# Patient Record
Sex: FEMALE | Race: WHITE | NOT HISPANIC OR LATINO | Employment: UNEMPLOYED | URBAN - METROPOLITAN AREA
[De-identification: names, ages, dates, MRNs, and addresses within clinical notes are randomized per-mention and may not be internally consistent; named-entity substitution may affect disease eponyms.]

---

## 2017-01-01 ENCOUNTER — HOSPITAL ENCOUNTER (INPATIENT)
Facility: HOSPITAL | Age: 47
LOS: 1 days | DRG: 432 | End: 2017-11-15
Attending: EMERGENCY MEDICINE | Admitting: INTERNAL MEDICINE

## 2017-01-01 VITALS
OXYGEN SATURATION: 1 % | TEMPERATURE: 98 F | WEIGHT: 163.81 LBS | HEIGHT: 66 IN | SYSTOLIC BLOOD PRESSURE: 102 MMHG | BODY MASS INDEX: 26.33 KG/M2 | DIASTOLIC BLOOD PRESSURE: 66 MMHG

## 2017-01-01 DIAGNOSIS — J96.01 ACUTE HYPOXEMIC RESPIRATORY FAILURE: ICD-10-CM

## 2017-01-01 DIAGNOSIS — D62 ACUTE BLOOD LOSS ANEMIA: ICD-10-CM

## 2017-01-01 DIAGNOSIS — F10.939 ALCOHOL WITHDRAWAL SYNDROME WITH COMPLICATION: ICD-10-CM

## 2017-01-01 DIAGNOSIS — R57.9 SHOCK: ICD-10-CM

## 2017-01-01 DIAGNOSIS — K92.2 GASTROINTESTINAL HEMORRHAGE, UNSPECIFIED GASTROINTESTINAL HEMORRHAGE TYPE: Primary | ICD-10-CM

## 2017-01-01 DIAGNOSIS — Z97.8 ENDOTRACHEALLY INTUBATED: ICD-10-CM

## 2017-01-01 DIAGNOSIS — K74.60 CIRRHOSIS OF LIVER WITHOUT ASCITES, UNSPECIFIED HEPATIC CIRRHOSIS TYPE: ICD-10-CM

## 2017-01-01 LAB
ABO + RH BLD: NORMAL
ALBUMIN SERPL BCP-MCNC: 1.6 G/DL
ALBUMIN SERPL BCP-MCNC: 1.7 G/DL
ALBUMIN SERPL BCP-MCNC: 2.4 G/DL
ALBUMIN SERPL BCP-MCNC: 2.4 G/DL
ALBUMIN SERPL BCP-MCNC: 2.5 G/DL
ALBUMIN SERPL BCP-MCNC: 2.6 G/DL
ALBUMIN SERPL BCP-MCNC: 2.9 G/DL
ALLENS TEST: ABNORMAL
ALP SERPL-CCNC: 118 U/L
ALP SERPL-CCNC: 160 U/L
ALP SERPL-CCNC: 44 U/L
ALP SERPL-CCNC: 56 U/L
ALP SERPL-CCNC: 61 U/L
ALP SERPL-CCNC: 75 U/L
ALP SERPL-CCNC: 79 U/L
ALT SERPL W/O P-5'-P-CCNC: 114 U/L
ALT SERPL W/O P-5'-P-CCNC: 114 U/L
ALT SERPL W/O P-5'-P-CCNC: 289 U/L
ALT SERPL W/O P-5'-P-CCNC: 31 U/L
ALT SERPL W/O P-5'-P-CCNC: 555 U/L
ALT SERPL W/O P-5'-P-CCNC: 69 U/L
ALT SERPL W/O P-5'-P-CCNC: 825 U/L
AMPHET+METHAMPHET UR QL: NEGATIVE
ANION GAP SERPL CALC-SCNC: 25 MMOL/L
ANION GAP SERPL CALC-SCNC: 31 MMOL/L
ANION GAP SERPL CALC-SCNC: 32 MMOL/L
ANION GAP SERPL CALC-SCNC: 32 MMOL/L
ANION GAP SERPL CALC-SCNC: 35 MMOL/L
ANION GAP SERPL CALC-SCNC: 35 MMOL/L
ANION GAP SERPL CALC-SCNC: 36 MMOL/L
ANISOCYTOSIS BLD QL SMEAR: SLIGHT
APTT BLDCRRT: 104.6 SEC
AST SERPL-CCNC: 138 U/L
AST SERPL-CCNC: 1754 U/L
AST SERPL-CCNC: 374 U/L
AST SERPL-CCNC: 4104 U/L
AST SERPL-CCNC: 636 U/L
AST SERPL-CCNC: 671 U/L
AST SERPL-CCNC: 7915 U/L
BARBITURATES UR QL SCN>200 NG/ML: NEGATIVE
BASOPHILS # BLD AUTO: 0 K/UL
BASOPHILS # BLD AUTO: ABNORMAL K/UL
BASOPHILS NFR BLD: 0 %
BASOPHILS NFR BLD: 0.1 %
BASOPHILS NFR BLD: 0.1 %
BASOPHILS NFR BLD: 0.2 %
BENZODIAZ UR QL SCN>200 NG/ML: NEGATIVE
BILIRUB SERPL-MCNC: 3.5 MG/DL
BILIRUB SERPL-MCNC: 3.7 MG/DL
BILIRUB SERPL-MCNC: 4.3 MG/DL
BILIRUB SERPL-MCNC: 4.8 MG/DL
BILIRUB SERPL-MCNC: 6.4 MG/DL
BILIRUB SERPL-MCNC: 7.4 MG/DL
BILIRUB SERPL-MCNC: 8.2 MG/DL
BLD GP AB SCN CELLS X3 SERPL QL: NORMAL
BLD PROD TYP BPU: NORMAL
BLOOD UNIT EXPIRATION DATE: NORMAL
BLOOD UNIT TYPE CODE: 600
BLOOD UNIT TYPE CODE: 600
BLOOD UNIT TYPE CODE: 6200
BLOOD UNIT TYPE CODE: 9500
BLOOD UNIT TYPE CODE: 9500
BLOOD UNIT TYPE: NORMAL
BUN SERPL-MCNC: 10 MG/DL
BUN SERPL-MCNC: 11 MG/DL
BUN SERPL-MCNC: 11 MG/DL
BUN SERPL-MCNC: 9 MG/DL
BUN SERPL-MCNC: 9 MG/DL
BURR CELLS BLD QL SMEAR: ABNORMAL
BZE UR QL SCN: NEGATIVE
CA-I BLDV-SCNC: 0.65 MMOL/L
CALCIUM SERPL-MCNC: 5.9 MG/DL
CALCIUM SERPL-MCNC: 6.1 MG/DL
CALCIUM SERPL-MCNC: 6.3 MG/DL
CALCIUM SERPL-MCNC: 6.3 MG/DL
CALCIUM SERPL-MCNC: 6.6 MG/DL
CALCIUM SERPL-MCNC: 7.1 MG/DL
CALCIUM SERPL-MCNC: 8.7 MG/DL
CANNABINOIDS UR QL SCN: NEGATIVE
CHLORIDE SERPL-SCNC: 102 MMOL/L
CHLORIDE SERPL-SCNC: 104 MMOL/L
CHLORIDE SERPL-SCNC: 105 MMOL/L
CHLORIDE SERPL-SCNC: 107 MMOL/L
CHLORIDE SERPL-SCNC: 97 MMOL/L
CHLORIDE SERPL-SCNC: 99 MMOL/L
CHLORIDE SERPL-SCNC: 99 MMOL/L
CHOLEST SERPL-MCNC: 57 MG/DL
CHOLEST/HDLC SERPL: 3.4 {RATIO}
CK MB SERPL-MCNC: 162.4 NG/ML
CK MB SERPL-MCNC: 162.4 NG/ML
CK MB SERPL-MCNC: 255.9 NG/ML
CK MB SERPL-MCNC: 75.6 NG/ML
CK MB SERPL-MCNC: 89.5 NG/ML
CK MB SERPL-MCNC: 89.5 NG/ML
CK MB SERPL-RTO: 4.6 %
CK MB SERPL-RTO: 4.8 %
CK MB SERPL-RTO: 5.5 %
CK SERPL-CCNC: 1368 U/L
CK SERPL-CCNC: 1860 U/L
CK SERPL-CCNC: 1860 U/L
CK SERPL-CCNC: 3380 U/L
CK SERPL-CCNC: 3380 U/L
CK SERPL-CCNC: 5569 U/L
CO2 SERPL-SCNC: 10 MMOL/L
CO2 SERPL-SCNC: 11 MMOL/L
CO2 SERPL-SCNC: 6 MMOL/L
CO2 SERPL-SCNC: 7 MMOL/L
CO2 SERPL-SCNC: 9 MMOL/L
CODING SYSTEM: NORMAL
CREAT SERPL-MCNC: 0.7 MG/DL
CREAT SERPL-MCNC: 0.9 MG/DL
CREAT SERPL-MCNC: 1.1 MG/DL
CREAT SERPL-MCNC: 1.2 MG/DL
CREAT SERPL-MCNC: 1.4 MG/DL
CREAT SERPL-MCNC: 1.5 MG/DL
CREAT SERPL-MCNC: 1.6 MG/DL
CREAT UR-MCNC: 49.5 MG/DL
DELSYS: ABNORMAL
DIFFERENTIAL METHOD: ABNORMAL
DISPENSE STATUS: NORMAL
EOSINOPHIL # BLD AUTO: 0 K/UL
EOSINOPHIL # BLD AUTO: ABNORMAL K/UL
EOSINOPHIL NFR BLD: 0 %
EOSINOPHIL NFR BLD: 0.1 %
EOSINOPHIL NFR BLD: 1 %
EOSINOPHIL NFR BLD: 1 %
EOSINOPHIL NFR BLD: 3 %
ERYTHROCYTE [DISTWIDTH] IN BLOOD BY AUTOMATED COUNT: 15.1 %
ERYTHROCYTE [DISTWIDTH] IN BLOOD BY AUTOMATED COUNT: 15.2 %
ERYTHROCYTE [DISTWIDTH] IN BLOOD BY AUTOMATED COUNT: 15.3 %
ERYTHROCYTE [DISTWIDTH] IN BLOOD BY AUTOMATED COUNT: 15.4 %
ERYTHROCYTE [DISTWIDTH] IN BLOOD BY AUTOMATED COUNT: 15.6 %
ERYTHROCYTE [DISTWIDTH] IN BLOOD BY AUTOMATED COUNT: 15.7 %
ERYTHROCYTE [DISTWIDTH] IN BLOOD BY AUTOMATED COUNT: 15.9 %
ERYTHROCYTE [DISTWIDTH] IN BLOOD BY AUTOMATED COUNT: 16.9 %
ERYTHROCYTE [DISTWIDTH] IN BLOOD BY AUTOMATED COUNT: 18.1 %
ERYTHROCYTE [SEDIMENTATION RATE] IN BLOOD BY WESTERGREN METHOD: 16 MM/H
ERYTHROCYTE [SEDIMENTATION RATE] IN BLOOD BY WESTERGREN METHOD: 26 MM/H
EST. GFR  (AFRICAN AMERICAN): 43 ML/MIN/1.73 M^2
EST. GFR  (AFRICAN AMERICAN): 47 ML/MIN/1.73 M^2
EST. GFR  (AFRICAN AMERICAN): 51 ML/MIN/1.73 M^2
EST. GFR  (AFRICAN AMERICAN): >60 ML/MIN/1.73 M^2
EST. GFR  (NON AFRICAN AMERICAN): 38 ML/MIN/1.73 M^2
EST. GFR  (NON AFRICAN AMERICAN): 41 ML/MIN/1.73 M^2
EST. GFR  (NON AFRICAN AMERICAN): 44 ML/MIN/1.73 M^2
EST. GFR  (NON AFRICAN AMERICAN): 53 ML/MIN/1.73 M^2
EST. GFR  (NON AFRICAN AMERICAN): 59 ML/MIN/1.73 M^2
EST. GFR  (NON AFRICAN AMERICAN): >60 ML/MIN/1.73 M^2
EST. GFR  (NON AFRICAN AMERICAN): >60 ML/MIN/1.73 M^2
ETCO2: 15
ETCO2: 16
ETCO2: 19
ETCO2: 19
FIO2: 100
FIO2: 50
FIO2: 70
GIANT PLATELETS BLD QL SMEAR: PRESENT
GLUCOSE SERPL-MCNC: 116 MG/DL
GLUCOSE SERPL-MCNC: 141 MG/DL
GLUCOSE SERPL-MCNC: 163 MG/DL
GLUCOSE SERPL-MCNC: 178 MG/DL
GLUCOSE SERPL-MCNC: 197 MG/DL
GLUCOSE SERPL-MCNC: 82 MG/DL
GLUCOSE SERPL-MCNC: 84 MG/DL
HCO3 UR-SCNC: 10.3 MMOL/L (ref 24–28)
HCO3 UR-SCNC: 11.2 MMOL/L (ref 24–28)
HCO3 UR-SCNC: 12.1 MMOL/L (ref 24–28)
HCO3 UR-SCNC: 12.2 MMOL/L (ref 24–28)
HCO3 UR-SCNC: 19.8 MMOL/L (ref 24–28)
HCO3 UR-SCNC: 8.3 MMOL/L (ref 24–28)
HCO3 UR-SCNC: 9.3 MMOL/L (ref 24–28)
HCO3 UR-SCNC: 9.6 MMOL/L (ref 24–28)
HCT VFR BLD AUTO: 14.7 %
HCT VFR BLD AUTO: 19.9 %
HCT VFR BLD AUTO: 21.2 %
HCT VFR BLD AUTO: 23 %
HCT VFR BLD AUTO: 23.5 %
HCT VFR BLD AUTO: 23.6 %
HCT VFR BLD AUTO: 25.1 %
HCT VFR BLD AUTO: 27.2 %
HCT VFR BLD AUTO: 32 %
HDLC SERPL-MCNC: 17 MG/DL
HDLC SERPL: 29.8 %
HGB BLD-MCNC: 10.5 G/DL
HGB BLD-MCNC: 5.1 G/DL
HGB BLD-MCNC: 5.1 G/DL
HGB BLD-MCNC: 6.7 G/DL
HGB BLD-MCNC: 7.2 G/DL
HGB BLD-MCNC: 7.9 G/DL
HGB BLD-MCNC: 8.1 G/DL
HGB BLD-MCNC: 8.1 G/DL
HGB BLD-MCNC: 8.2 G/DL
HGB BLD-MCNC: 8.4 G/DL
HGB BLD-MCNC: 8.4 G/DL
HGB BLD-MCNC: 9.1 G/DL
HGB BLD-MCNC: 9.1 G/DL
INR PPP: 2.8
LACTATE SERPL-SCNC: >12 MMOL/L
LDLC SERPL CALC-MCNC: ABNORMAL MG/DL
LYMPHOCYTES # BLD AUTO: 1.1 K/UL
LYMPHOCYTES # BLD AUTO: 1.5 K/UL
LYMPHOCYTES # BLD AUTO: 1.7 K/UL
LYMPHOCYTES # BLD AUTO: ABNORMAL K/UL
LYMPHOCYTES NFR BLD: 12.5 %
LYMPHOCYTES NFR BLD: 12.7 %
LYMPHOCYTES NFR BLD: 16.9 %
LYMPHOCYTES NFR BLD: 19 %
LYMPHOCYTES NFR BLD: 20 %
LYMPHOCYTES NFR BLD: 22 %
LYMPHOCYTES NFR BLD: 23 %
LYMPHOCYTES NFR BLD: 24 %
LYMPHOCYTES NFR BLD: 35 %
MAGNESIUM SERPL-MCNC: 1.4 MG/DL
MCH RBC QN AUTO: 29.2 PG
MCH RBC QN AUTO: 29.4 PG
MCH RBC QN AUTO: 29.6 PG
MCH RBC QN AUTO: 29.6 PG
MCH RBC QN AUTO: 30.5 PG
MCH RBC QN AUTO: 30.6 PG
MCH RBC QN AUTO: 33.1 PG
MCH RBC QN AUTO: 33.6 PG
MCH RBC QN AUTO: 35.5 PG
MCHC RBC AUTO-ENTMCNC: 32.9 G/DL
MCHC RBC AUTO-ENTMCNC: 33.2 G/DL
MCHC RBC AUTO-ENTMCNC: 33.4 G/DL
MCHC RBC AUTO-ENTMCNC: 33.5 G/DL
MCHC RBC AUTO-ENTMCNC: 33.6 G/DL
MCHC RBC AUTO-ENTMCNC: 34.1 G/DL
MCHC RBC AUTO-ENTMCNC: 34.8 G/DL
MCHC RBC AUTO-ENTMCNC: 35.1 G/DL
MCHC RBC AUTO-ENTMCNC: 35.1 G/DL
MCV RBC AUTO: 107 FL
MCV RBC AUTO: 87 FL
MCV RBC AUTO: 87 FL
MCV RBC AUTO: 88 FL
MCV RBC AUTO: 88 FL
MCV RBC AUTO: 89 FL
MCV RBC AUTO: 89 FL
MCV RBC AUTO: 95 FL
MCV RBC AUTO: 99 FL
METHADONE UR QL SCN>300 NG/ML: NEGATIVE
MIN VOL: 14.1
MIN VOL: 15.4
MIN VOL: 15.7
MIN VOL: 15.7
MIN VOL: 16
MIN VOL: 16.1
MODE: ABNORMAL
MONOCYTES # BLD AUTO: 0.2 K/UL
MONOCYTES # BLD AUTO: 0.4 K/UL
MONOCYTES # BLD AUTO: 0.6 K/UL
MONOCYTES # BLD AUTO: ABNORMAL K/UL
MONOCYTES NFR BLD: 0 %
MONOCYTES NFR BLD: 2.5 %
MONOCYTES NFR BLD: 3 %
MONOCYTES NFR BLD: 3.3 %
MONOCYTES NFR BLD: 4 %
MONOCYTES NFR BLD: 4 %
MONOCYTES NFR BLD: 5 %
MONOCYTES NFR BLD: 5 %
MONOCYTES NFR BLD: 6.3 %
NEUTROPHILS # BLD AUTO: 11.4 K/UL
NEUTROPHILS # BLD AUTO: 7 K/UL
NEUTROPHILS # BLD AUTO: 7.5 K/UL
NEUTROPHILS NFR BLD: 47 %
NEUTROPHILS NFR BLD: 68 %
NEUTROPHILS NFR BLD: 69 %
NEUTROPHILS NFR BLD: 71 %
NEUTROPHILS NFR BLD: 72 %
NEUTROPHILS NFR BLD: 73 %
NEUTROPHILS NFR BLD: 76.7 %
NEUTROPHILS NFR BLD: 83.7 %
NEUTROPHILS NFR BLD: 84.9 %
NEUTS BAND NFR BLD MANUAL: 11 %
NEUTS BAND NFR BLD MANUAL: 2 %
NEUTS BAND NFR BLD MANUAL: 2 %
NEUTS BAND NFR BLD MANUAL: 3 %
NEUTS BAND NFR BLD MANUAL: 6 %
NEUTS BAND NFR BLD MANUAL: 7 %
NONHDLC SERPL-MCNC: 40 MG/DL
NRBC BLD-RTO: 2 /100 WBC
NRBC BLD-RTO: 2 /100 WBC
NRBC BLD-RTO: 3 /100 WBC
NRBC BLD-RTO: 4 /100 WBC
NRBC BLD-RTO: 5 /100 WBC
NUM UNITS TRANS FFP: NORMAL
NUM UNITS TRANS PACKED RBC: NORMAL
OPIATES UR QL SCN: NEGATIVE
OVALOCYTES BLD QL SMEAR: ABNORMAL
OVALOCYTES BLD QL SMEAR: ABNORMAL
PCO2 BLDA: 26 MMHG (ref 35–45)
PCO2 BLDA: 29.1 MMHG (ref 35–45)
PCO2 BLDA: 29.1 MMHG (ref 35–45)
PCO2 BLDA: 33.4 MMHG (ref 35–45)
PCO2 BLDA: 33.4 MMHG (ref 35–45)
PCO2 BLDA: 34.8 MMHG (ref 35–45)
PCO2 BLDA: 36.6 MMHG (ref 35–45)
PCO2 BLDA: 40.7 MMHG (ref 35–45)
PCP UR QL SCN>25 NG/ML: NEGATIVE
PEEP: 5
PEEP: 8
PH SMN: 6.97 [PH] (ref 7.35–7.45)
PH SMN: 7 [PH] (ref 7.35–7.45)
PH SMN: 7.05 [PH] (ref 7.35–7.45)
PH SMN: 7.17 [PH] (ref 7.35–7.45)
PH SMN: 7.19 [PH] (ref 7.35–7.45)
PH SMN: 7.21 [PH] (ref 7.35–7.45)
PH SMN: 7.23 [PH] (ref 7.35–7.45)
PH SMN: 7.34 [PH] (ref 7.35–7.45)
PIP: 19
PIP: 19
PIP: 21
PIP: 28
PIP: 30
PIP: 37
PLATELET # BLD AUTO: 100 K/UL
PLATELET # BLD AUTO: 109 K/UL
PLATELET # BLD AUTO: 16 K/UL
PLATELET # BLD AUTO: 43 K/UL
PLATELET # BLD AUTO: 55 K/UL
PLATELET # BLD AUTO: 56 K/UL
PLATELET # BLD AUTO: 60 K/UL
PLATELET # BLD AUTO: 67 K/UL
PLATELET # BLD AUTO: 91 K/UL
PLATELET BLD QL SMEAR: ABNORMAL
PMV BLD AUTO: 10.2 FL
PMV BLD AUTO: 7.7 FL
PMV BLD AUTO: 7.7 FL
PMV BLD AUTO: 7.9 FL
PMV BLD AUTO: 9.1 FL
PMV BLD AUTO: 9.2 FL
PMV BLD AUTO: 9.3 FL
PMV BLD AUTO: 9.4 FL
PMV BLD AUTO: 9.9 FL
PO2 BLDA: 217 MMHG (ref 80–100)
PO2 BLDA: 275 MMHG (ref 80–100)
PO2 BLDA: 334 MMHG (ref 80–100)
PO2 BLDA: 376 MMHG (ref 80–100)
PO2 BLDA: 58 MMHG (ref 80–100)
PO2 BLDA: 587 MMHG (ref 80–100)
PO2 BLDA: 64 MMHG (ref 80–100)
PO2 BLDA: 73 MMHG (ref 80–100)
POC BE: -15 MMOL/L
POC BE: -16 MMOL/L
POC BE: -17 MMOL/L
POC BE: -18 MMOL/L
POC BE: -21 MMOL/L
POC BE: -23 MMOL/L
POC BE: -23 MMOL/L
POC BE: -6 MMOL/L
POC SATURATED O2: 100 % (ref 95–100)
POC SATURATED O2: 84 % (ref 95–100)
POC SATURATED O2: 88 % (ref 95–100)
POC SATURATED O2: 90 % (ref 95–100)
POC TCO2: 10 MMOL/L (ref 23–27)
POC TCO2: 11 MMOL/L (ref 23–27)
POC TCO2: 11 MMOL/L (ref 23–27)
POC TCO2: 12 MMOL/L (ref 23–27)
POC TCO2: 13 MMOL/L (ref 23–27)
POC TCO2: 13 MMOL/L (ref 23–27)
POC TCO2: 21 MMOL/L (ref 23–27)
POC TCO2: 9 MMOL/L (ref 23–27)
POIKILOCYTOSIS BLD QL SMEAR: SLIGHT
POLYCHROMASIA BLD QL SMEAR: ABNORMAL
POTASSIUM SERPL-SCNC: 3.2 MMOL/L
POTASSIUM SERPL-SCNC: 3.3 MMOL/L
POTASSIUM SERPL-SCNC: 3.4 MMOL/L
POTASSIUM SERPL-SCNC: 3.5 MMOL/L
POTASSIUM SERPL-SCNC: 3.6 MMOL/L
POTASSIUM SERPL-SCNC: 4.3 MMOL/L
POTASSIUM SERPL-SCNC: 4.8 MMOL/L
PROT SERPL-MCNC: 2.6 G/DL
PROT SERPL-MCNC: 2.9 G/DL
PROT SERPL-MCNC: 3.7 G/DL
PROT SERPL-MCNC: 3.7 G/DL
PROT SERPL-MCNC: 3.9 G/DL
PROT SERPL-MCNC: 4 G/DL
PROT SERPL-MCNC: 4.2 G/DL
PROTHROMBIN TIME: 28.2 SEC
RBC # BLD AUTO: 1.55 M/UL
RBC # BLD AUTO: 2.15 M/UL
RBC # BLD AUTO: 2.22 M/UL
RBC # BLD AUTO: 2.28 M/UL
RBC # BLD AUTO: 2.64 M/UL
RBC # BLD AUTO: 2.7 M/UL
RBC # BLD AUTO: 2.83 M/UL
RBC # BLD AUTO: 3.08 M/UL
RBC # BLD AUTO: 3.61 M/UL
SAMPLE: ABNORMAL
SITE: ABNORMAL
SODIUM SERPL-SCNC: 140 MMOL/L
SODIUM SERPL-SCNC: 141 MMOL/L
SODIUM SERPL-SCNC: 141 MMOL/L
SODIUM SERPL-SCNC: 143 MMOL/L
SODIUM SERPL-SCNC: 144 MMOL/L
SODIUM SERPL-SCNC: 146 MMOL/L
SODIUM SERPL-SCNC: 149 MMOL/L
SP02: 100
TOXICOLOGY INFORMATION: NORMAL
TRANS PLATPHERESIS VOL PATIENT: NORMAL ML
TRIGL SERPL-MCNC: 429 MG/DL
TROPONIN I SERPL DL<=0.01 NG/ML-MCNC: 0.13 NG/ML
TROPONIN I SERPL DL<=0.01 NG/ML-MCNC: 0.16 NG/ML
TSH SERPL DL<=0.005 MIU/L-ACNC: 0.88 UIU/ML
VT: 400
WBC # BLD AUTO: 10.7 K/UL
WBC # BLD AUTO: 11.4 K/UL
WBC # BLD AUTO: 11.5 K/UL
WBC # BLD AUTO: 12.6 K/UL
WBC # BLD AUTO: 13.7 K/UL
WBC # BLD AUTO: 8.1 K/UL
WBC # BLD AUTO: 8.8 K/UL
WBC # BLD AUTO: 9.1 K/UL
WBC # BLD AUTO: 9.7 K/UL

## 2017-01-01 PROCEDURE — 97802 MEDICAL NUTRITION INDIV IN: CPT

## 2017-01-01 PROCEDURE — C9113 INJ PANTOPRAZOLE SODIUM, VIA: HCPCS | Performed by: EMERGENCY MEDICINE

## 2017-01-01 PROCEDURE — 25000003 PHARM REV CODE 250

## 2017-01-01 PROCEDURE — A4216 STERILE WATER/SALINE, 10 ML: HCPCS | Performed by: INTERNAL MEDICINE

## 2017-01-01 PROCEDURE — 96366 THER/PROPH/DIAG IV INF ADDON: CPT | Mod: 59

## 2017-01-01 PROCEDURE — 99223 1ST HOSP IP/OBS HIGH 75: CPT | Mod: 25,,, | Performed by: INTERNAL MEDICINE

## 2017-01-01 PROCEDURE — 99233 SBSQ HOSP IP/OBS HIGH 50: CPT | Mod: ,,, | Performed by: INTERNAL MEDICINE

## 2017-01-01 PROCEDURE — 99900035 HC TECH TIME PER 15 MIN (STAT)

## 2017-01-01 PROCEDURE — 36415 COLL VENOUS BLD VENIPUNCTURE: CPT

## 2017-01-01 PROCEDURE — 25000003 PHARM REV CODE 250: Performed by: EMERGENCY MEDICINE

## 2017-01-01 PROCEDURE — 99291 CRITICAL CARE FIRST HOUR: CPT | Mod: ,,, | Performed by: INTERNAL MEDICINE

## 2017-01-01 PROCEDURE — 36600 WITHDRAWAL OF ARTERIAL BLOOD: CPT

## 2017-01-01 PROCEDURE — P9045 ALBUMIN (HUMAN), 5%, 250 ML: HCPCS | Performed by: INTERNAL MEDICINE

## 2017-01-01 PROCEDURE — 36430 TRANSFUSION BLD/BLD COMPNT: CPT

## 2017-01-01 PROCEDURE — 25000003 PHARM REV CODE 250: Performed by: INTERNAL MEDICINE

## 2017-01-01 PROCEDURE — 80061 LIPID PANEL: CPT

## 2017-01-01 PROCEDURE — 96375 TX/PRO/DX INJ NEW DRUG ADDON: CPT | Mod: 59

## 2017-01-01 PROCEDURE — 99285 EMERGENCY DEPT VISIT HI MDM: CPT | Mod: 25

## 2017-01-01 PROCEDURE — 37799 UNLISTED PX VASCULAR SURGERY: CPT

## 2017-01-01 PROCEDURE — 80053 COMPREHEN METABOLIC PANEL: CPT

## 2017-01-01 PROCEDURE — P9016 RBC LEUKOCYTES REDUCED: HCPCS

## 2017-01-01 PROCEDURE — 80307 DRUG TEST PRSMV CHEM ANLYZR: CPT

## 2017-01-01 PROCEDURE — 36680 INSERT NEEDLE BONE CAVITY: CPT | Mod: 59

## 2017-01-01 PROCEDURE — P9047 ALBUMIN (HUMAN), 25%, 50ML: HCPCS | Performed by: INTERNAL MEDICINE

## 2017-01-01 PROCEDURE — 93307 TTE W/O DOPPLER COMPLETE: CPT

## 2017-01-01 PROCEDURE — 20000000 HC ICU ROOM

## 2017-01-01 PROCEDURE — 99223 1ST HOSP IP/OBS HIGH 75: CPT | Mod: AI,,, | Performed by: INTERNAL MEDICINE

## 2017-01-01 PROCEDURE — 96365 THER/PROPH/DIAG IV INF INIT: CPT | Mod: 59

## 2017-01-01 PROCEDURE — 85027 COMPLETE CBC AUTOMATED: CPT

## 2017-01-01 PROCEDURE — 82330 ASSAY OF CALCIUM: CPT

## 2017-01-01 PROCEDURE — 82553 CREATINE MB FRACTION: CPT | Mod: 91

## 2017-01-01 PROCEDURE — 27000221 HC OXYGEN, UP TO 24 HOURS

## 2017-01-01 PROCEDURE — 99232 SBSQ HOSP IP/OBS MODERATE 35: CPT | Mod: ,,, | Performed by: INTERNAL MEDICINE

## 2017-01-01 PROCEDURE — 85018 HEMOGLOBIN: CPT

## 2017-01-01 PROCEDURE — 43255 EGD CONTROL BLEEDING ANY: CPT | Performed by: INTERNAL MEDICINE

## 2017-01-01 PROCEDURE — 85007 BL SMEAR W/DIFF WBC COUNT: CPT | Mod: 91

## 2017-01-01 PROCEDURE — 63600175 PHARM REV CODE 636 W HCPCS: Performed by: INTERNAL MEDICINE

## 2017-01-01 PROCEDURE — 0BH17EZ INSERTION OF ENDOTRACHEAL AIRWAY INTO TRACHEA, VIA NATURAL OR ARTIFICIAL OPENING: ICD-10-PCS | Performed by: EMERGENCY MEDICINE

## 2017-01-01 PROCEDURE — 63600175 PHARM REV CODE 636 W HCPCS

## 2017-01-01 PROCEDURE — 36620 INSERTION CATHETER ARTERY: CPT

## 2017-01-01 PROCEDURE — 80053 COMPREHEN METABOLIC PANEL: CPT | Mod: 91

## 2017-01-01 PROCEDURE — 85610 PROTHROMBIN TIME: CPT

## 2017-01-01 PROCEDURE — 94770 HC EXHALED C02 TEST: CPT

## 2017-01-01 PROCEDURE — C1751 CATH, INF, PER/CENT/MIDLINE: HCPCS

## 2017-01-01 PROCEDURE — 86985 SPLIT BLOOD OR PRODUCTS: CPT

## 2017-01-01 PROCEDURE — 27200997: Performed by: INTERNAL MEDICINE

## 2017-01-01 PROCEDURE — 86900 BLOOD TYPING SEROLOGIC ABO: CPT

## 2017-01-01 PROCEDURE — 86850 RBC ANTIBODY SCREEN: CPT

## 2017-01-01 PROCEDURE — 84484 ASSAY OF TROPONIN QUANT: CPT | Mod: 91

## 2017-01-01 PROCEDURE — 31500 INSERT EMERGENCY AIRWAY: CPT

## 2017-01-01 PROCEDURE — 5A1945Z RESPIRATORY VENTILATION, 24-96 CONSECUTIVE HOURS: ICD-10-PCS | Performed by: EMERGENCY MEDICINE

## 2017-01-01 PROCEDURE — P9011 BLOOD SPLIT UNIT: HCPCS

## 2017-01-01 PROCEDURE — 43244 EGD VARICES LIGATION: CPT | Mod: ,,, | Performed by: INTERNAL MEDICINE

## 2017-01-01 PROCEDURE — 63600175 PHARM REV CODE 636 W HCPCS: Performed by: EMERGENCY MEDICINE

## 2017-01-01 PROCEDURE — C9113 INJ PANTOPRAZOLE SODIUM, VIA: HCPCS | Performed by: INTERNAL MEDICINE

## 2017-01-01 PROCEDURE — P9017 PLASMA 1 DONOR FRZ W/IN 8 HR: HCPCS

## 2017-01-01 PROCEDURE — 94002 VENT MGMT INPAT INIT DAY: CPT

## 2017-01-01 PROCEDURE — 86920 COMPATIBILITY TEST SPIN: CPT

## 2017-01-01 PROCEDURE — 84484 ASSAY OF TROPONIN QUANT: CPT

## 2017-01-01 PROCEDURE — 83605 ASSAY OF LACTIC ACID: CPT

## 2017-01-01 PROCEDURE — 02HV33Z INSERTION OF INFUSION DEVICE INTO SUPERIOR VENA CAVA, PERCUTANEOUS APPROACH: ICD-10-PCS | Performed by: EMERGENCY MEDICINE

## 2017-01-01 PROCEDURE — 0W3P8ZZ CONTROL BLEEDING IN GASTROINTESTINAL TRACT, VIA NATURAL OR ARTIFICIAL OPENING ENDOSCOPIC: ICD-10-PCS | Performed by: INTERNAL MEDICINE

## 2017-01-01 PROCEDURE — 82803 BLOOD GASES ANY COMBINATION: CPT

## 2017-01-01 PROCEDURE — 06L38CZ OCCLUSION OF ESOPHAGEAL VEIN WITH EXTRALUMINAL DEVICE, VIA NATURAL OR ARTIFICIAL OPENING ENDOSCOPIC: ICD-10-PCS | Performed by: INTERNAL MEDICINE

## 2017-01-01 PROCEDURE — 36569 INSJ PICC 5 YR+ W/O IMAGING: CPT

## 2017-01-01 PROCEDURE — 84443 ASSAY THYROID STIM HORMONE: CPT

## 2017-01-01 PROCEDURE — 85025 COMPLETE CBC W/AUTO DIFF WBC: CPT | Mod: 91

## 2017-01-01 PROCEDURE — 76937 US GUIDE VASCULAR ACCESS: CPT

## 2017-01-01 PROCEDURE — 27201022: Performed by: INTERNAL MEDICINE

## 2017-01-01 PROCEDURE — 85730 THROMBOPLASTIN TIME PARTIAL: CPT

## 2017-01-01 PROCEDURE — 83735 ASSAY OF MAGNESIUM: CPT

## 2017-01-01 PROCEDURE — 96368 THER/DIAG CONCURRENT INF: CPT | Mod: 59

## 2017-01-01 PROCEDURE — 43255 EGD CONTROL BLEEDING ANY: CPT | Mod: 59,,, | Performed by: INTERNAL MEDICINE

## 2017-01-01 PROCEDURE — 85027 COMPLETE CBC AUTOMATED: CPT | Mod: 91

## 2017-01-01 PROCEDURE — P9035 PLATELET PHERES LEUKOREDUCED: HCPCS

## 2017-01-01 RX ORDER — EPINEPHRINE 0.1 MG/ML
INJECTION INTRAVENOUS
Status: DISCONTINUED
Start: 2017-01-01 | End: 2017-01-01 | Stop reason: WASHOUT

## 2017-01-01 RX ORDER — MIDAZOLAM HYDROCHLORIDE 1 MG/ML
2 INJECTION INTRAMUSCULAR; INTRAVENOUS ONCE
Status: COMPLETED | OUTPATIENT
Start: 2017-01-01 | End: 2017-01-01

## 2017-01-01 RX ORDER — PROPOFOL 10 MG/ML
20 INJECTION, EMULSION INTRAVENOUS
Status: COMPLETED | OUTPATIENT
Start: 2017-01-01 | End: 2017-01-01

## 2017-01-01 RX ORDER — EPINEPHRINE 1 MG/ML
INJECTION INTRAMUSCULAR; INTRAVENOUS; SUBCUTANEOUS CODE/TRAUMA/SEDATION MEDICATION
Status: COMPLETED | OUTPATIENT
Start: 2017-01-01 | End: 2017-01-01

## 2017-01-01 RX ORDER — HYDROCODONE BITARTRATE AND ACETAMINOPHEN 500; 5 MG/1; MG/1
TABLET ORAL
Status: DISCONTINUED | OUTPATIENT
Start: 2017-01-01 | End: 2017-01-01

## 2017-01-01 RX ORDER — ONDANSETRON 2 MG/ML
8 INJECTION INTRAMUSCULAR; INTRAVENOUS
Status: COMPLETED | OUTPATIENT
Start: 2017-01-01 | End: 2017-01-01

## 2017-01-01 RX ORDER — POTASSIUM CHLORIDE 14.9 MG/ML
20 INJECTION INTRAVENOUS ONCE
Status: COMPLETED | OUTPATIENT
Start: 2017-01-01 | End: 2017-01-01

## 2017-01-01 RX ORDER — SODIUM BICARBONATE 1 MEQ/ML
SYRINGE (ML) INTRAVENOUS CODE/TRAUMA/SEDATION MEDICATION
Status: COMPLETED | OUTPATIENT
Start: 2017-01-01 | End: 2017-01-01

## 2017-01-01 RX ORDER — ALBUMIN HUMAN 50 G/1000ML
50 SOLUTION INTRAVENOUS ONCE
Status: COMPLETED | OUTPATIENT
Start: 2017-01-01 | End: 2017-01-01

## 2017-01-01 RX ORDER — MIDAZOLAM HYDROCHLORIDE 2 MG/2ML
2 INJECTION, SOLUTION INTRAMUSCULAR; INTRAVENOUS
Status: DISCONTINUED | OUTPATIENT
Start: 2017-01-01 | End: 2017-01-01 | Stop reason: HOSPADM

## 2017-01-01 RX ORDER — HYDROCODONE BITARTRATE AND ACETAMINOPHEN 500; 5 MG/1; MG/1
TABLET ORAL
Status: DISCONTINUED | OUTPATIENT
Start: 2017-01-01 | End: 2017-01-01 | Stop reason: HOSPADM

## 2017-01-01 RX ORDER — DEXTROMETHORPHAN/PSEUDOEPHED 2.5-7.5/.8
DROPS ORAL
Status: DISCONTINUED
Start: 2017-01-01 | End: 2017-01-01 | Stop reason: WASHOUT

## 2017-01-01 RX ORDER — POTASSIUM CHLORIDE 20 MEQ/15ML
60 SOLUTION ORAL
Status: COMPLETED | OUTPATIENT
Start: 2017-01-01 | End: 2017-01-01

## 2017-01-01 RX ORDER — ALBUMIN HUMAN 250 G/1000ML
50 SOLUTION INTRAVENOUS ONCE
Status: COMPLETED | OUTPATIENT
Start: 2017-01-01 | End: 2017-01-01

## 2017-01-01 RX ORDER — INDOMETHACIN 25 MG/1
CAPSULE ORAL
Status: DISPENSED
Start: 2017-01-01 | End: 2017-01-01

## 2017-01-01 RX ORDER — SODIUM CHLORIDE 0.9 % (FLUSH) 0.9 %
10 SYRINGE (ML) INJECTION
Status: DISCONTINUED | OUTPATIENT
Start: 2017-01-01 | End: 2017-01-01 | Stop reason: HOSPADM

## 2017-01-01 RX ORDER — SUCCINYLCHOLINE CHLORIDE 20 MG/ML
100 INJECTION INTRAMUSCULAR; INTRAVENOUS
Status: COMPLETED | OUTPATIENT
Start: 2017-01-01 | End: 2017-01-01

## 2017-01-01 RX ORDER — MIDAZOLAM HYDROCHLORIDE 1 MG/ML
INJECTION INTRAMUSCULAR; INTRAVENOUS
Status: DISCONTINUED
Start: 2017-01-01 | End: 2017-01-01 | Stop reason: HOSPADM

## 2017-01-01 RX ORDER — LORAZEPAM 2 MG/ML
2 INJECTION INTRAMUSCULAR
Status: COMPLETED | OUTPATIENT
Start: 2017-01-01 | End: 2017-01-01

## 2017-01-01 RX ORDER — ACETAMINOPHEN 325 MG/1
650 TABLET ORAL EVERY 6 HOURS PRN
Status: DISCONTINUED | OUTPATIENT
Start: 2017-01-01 | End: 2017-01-01 | Stop reason: HOSPADM

## 2017-01-01 RX ORDER — FENTANYL CITRATE 50 UG/ML
25 INJECTION, SOLUTION INTRAMUSCULAR; INTRAVENOUS
Status: DISCONTINUED | OUTPATIENT
Start: 2017-01-01 | End: 2017-01-01 | Stop reason: HOSPADM

## 2017-01-01 RX ORDER — HYDROCODONE BITARTRATE AND ACETAMINOPHEN 500; 5 MG/1; MG/1
TABLET ORAL ONCE
Status: DISCONTINUED | OUTPATIENT
Start: 2017-01-01 | End: 2017-01-01 | Stop reason: HOSPADM

## 2017-01-01 RX ORDER — MORPHINE SULFATE 4 MG/ML
INJECTION, SOLUTION INTRAMUSCULAR; INTRAVENOUS
Status: DISCONTINUED
Start: 2017-01-01 | End: 2017-01-01 | Stop reason: HOSPADM

## 2017-01-01 RX ORDER — AMOXICILLIN 250 MG
1 CAPSULE ORAL DAILY PRN
Status: DISCONTINUED | OUTPATIENT
Start: 2017-01-01 | End: 2017-01-01 | Stop reason: HOSPADM

## 2017-01-01 RX ORDER — MORPHINE SULFATE 4 MG/ML
4 INJECTION, SOLUTION INTRAMUSCULAR; INTRAVENOUS ONCE
Status: COMPLETED | OUTPATIENT
Start: 2017-01-01 | End: 2017-01-01

## 2017-01-01 RX ORDER — SODIUM CHLORIDE 0.9 % (FLUSH) 0.9 %
10 SYRINGE (ML) INJECTION EVERY 6 HOURS
Status: DISCONTINUED | OUTPATIENT
Start: 2017-01-01 | End: 2017-01-01 | Stop reason: HOSPADM

## 2017-01-01 RX ORDER — ETOMIDATE 2 MG/ML
20 INJECTION INTRAVENOUS
Status: COMPLETED | OUTPATIENT
Start: 2017-01-01 | End: 2017-01-01

## 2017-01-01 RX ORDER — MAGNESIUM SULFATE HEPTAHYDRATE 40 MG/ML
2 INJECTION, SOLUTION INTRAVENOUS ONCE
Status: COMPLETED | OUTPATIENT
Start: 2017-01-01 | End: 2017-01-01

## 2017-01-01 RX ORDER — ONDANSETRON 2 MG/ML
4 INJECTION INTRAMUSCULAR; INTRAVENOUS EVERY 8 HOURS PRN
Status: DISCONTINUED | OUTPATIENT
Start: 2017-01-01 | End: 2017-01-01 | Stop reason: HOSPADM

## 2017-01-01 RX ADMIN — MIDAZOLAM HYDROCHLORIDE 2 MG: 1 INJECTION, SOLUTION INTRAMUSCULAR; INTRAVENOUS at 04:11

## 2017-01-01 RX ADMIN — FENTANYL CITRATE 25 MCG: 50 INJECTION INTRAMUSCULAR; INTRAVENOUS at 01:11

## 2017-01-01 RX ADMIN — SODIUM CHLORIDE, PRESERVATIVE FREE 10 ML: 5 INJECTION INTRAVENOUS at 01:11

## 2017-01-01 RX ADMIN — MIDAZOLAM HYDROCHLORIDE 2 MG: 1 INJECTION, SOLUTION INTRAMUSCULAR; INTRAVENOUS at 03:11

## 2017-01-01 RX ADMIN — FENTANYL CITRATE 25 MCG: 50 INJECTION INTRAMUSCULAR; INTRAVENOUS at 02:11

## 2017-01-01 RX ADMIN — PHENYLEPHRINE HYDROCHLORIDE 5 MCG/KG/MIN: 10 INJECTION INTRAVENOUS at 12:11

## 2017-01-01 RX ADMIN — EPINEPHRINE 1 MG: 1 INJECTION PARENTERAL at 09:11

## 2017-01-01 RX ADMIN — NOREPINEPHRINE BITARTRATE 3 MCG/KG/MIN: 1 INJECTION, SOLUTION, CONCENTRATE INTRAVENOUS at 12:11

## 2017-01-01 RX ADMIN — ETOMIDATE 20 MG: 2 INJECTION, SOLUTION INTRAVENOUS at 06:11

## 2017-01-01 RX ADMIN — FENTANYL CITRATE 25 MCG: 50 INJECTION INTRAMUSCULAR; INTRAVENOUS at 12:11

## 2017-01-01 RX ADMIN — SODIUM BICARBONATE 50 MEQ: 84 INJECTION INTRAVENOUS at 12:11

## 2017-01-01 RX ADMIN — LORAZEPAM 2 MG: 2 INJECTION, SOLUTION INTRAMUSCULAR; INTRAVENOUS at 06:11

## 2017-01-01 RX ADMIN — DEXTROSE MONOHYDRATE: 5 INJECTION, SOLUTION INTRAVENOUS at 07:11

## 2017-01-01 RX ADMIN — FENTANYL CITRATE 25 MCG: 50 INJECTION INTRAMUSCULAR; INTRAVENOUS at 10:11

## 2017-01-01 RX ADMIN — NOREPINEPHRINE BITARTRATE 3 MCG/KG/MIN: 1 INJECTION INTRAVENOUS at 11:11

## 2017-01-01 RX ADMIN — NOREPINEPHRINE BITARTRATE 3 MCG/KG/MIN: 1 INJECTION INTRAVENOUS at 07:11

## 2017-01-01 RX ADMIN — FENTANYL CITRATE 25 MCG: 50 INJECTION INTRAMUSCULAR; INTRAVENOUS at 04:11

## 2017-01-01 RX ADMIN — OCTREOTIDE ACETATE 50 MCG/HR: 1000 INJECTION, SOLUTION INTRAVENOUS; SUBCUTANEOUS at 01:11

## 2017-01-01 RX ADMIN — DEXTROSE MONOHYDRATE 8 MG/HR: 5 INJECTION, SOLUTION INTRAVENOUS at 09:11

## 2017-01-01 RX ADMIN — FENTANYL CITRATE 25 MCG: 50 INJECTION INTRAMUSCULAR; INTRAVENOUS at 07:11

## 2017-01-01 RX ADMIN — NOREPINEPHRINE BITARTRATE 0.05 MCG/KG/MIN: 1 INJECTION, SOLUTION, CONCENTRATE INTRAVENOUS at 09:11

## 2017-01-01 RX ADMIN — PHENYLEPHRINE HYDROCHLORIDE 5 MCG/KG/MIN: 10 INJECTION, SOLUTION INTRAMUSCULAR; INTRAVENOUS; SUBCUTANEOUS at 11:11

## 2017-01-01 RX ADMIN — SODIUM BICARBONATE: 84 INJECTION, SOLUTION INTRAVENOUS at 09:11

## 2017-01-01 RX ADMIN — ALBUMIN (HUMAN) 50 G: 12.5 SOLUTION INTRAVENOUS at 08:11

## 2017-01-01 RX ADMIN — DEXTROSE MONOHYDRATE: 5 INJECTION, SOLUTION INTRAVENOUS at 05:11

## 2017-01-01 RX ADMIN — DEXTROSE MONOHYDRATE 8 MG/HR: 5 INJECTION, SOLUTION INTRAVENOUS at 05:11

## 2017-01-01 RX ADMIN — EPINEPHRINE 1 MG: 1 INJECTION PARENTERAL at 12:11

## 2017-01-01 RX ADMIN — ONDANSETRON 8 MG: 2 INJECTION INTRAMUSCULAR; INTRAVENOUS at 06:11

## 2017-01-01 RX ADMIN — OCTREOTIDE ACETATE 50 MCG/HR: 500 INJECTION, SOLUTION INTRAVENOUS; SUBCUTANEOUS at 05:11

## 2017-01-01 RX ADMIN — SODIUM CHLORIDE, PRESERVATIVE FREE 10 ML: 5 INJECTION INTRAVENOUS at 06:11

## 2017-01-01 RX ADMIN — CALCIUM GLUCONATE 1000 MG: 94 INJECTION, SOLUTION INTRAVENOUS at 09:11

## 2017-01-01 RX ADMIN — EPINEPHRINE 0.02 MCG/KG/MIN: 1 INJECTION PARENTERAL at 01:11

## 2017-01-01 RX ADMIN — ALBUMIN (HUMAN) 50 G: 12.5 SOLUTION INTRAVENOUS at 01:11

## 2017-01-01 RX ADMIN — FENTANYL CITRATE 25 MCG: 50 INJECTION INTRAMUSCULAR; INTRAVENOUS at 03:11

## 2017-01-01 RX ADMIN — POTASSIUM CHLORIDE 20 MEQ: 200 INJECTION, SOLUTION INTRAVENOUS at 09:11

## 2017-01-01 RX ADMIN — SODIUM CHLORIDE: 900 INJECTION, SOLUTION INTRAVENOUS at 01:11

## 2017-01-01 RX ADMIN — DEXTROSE MONOHYDRATE: 5 INJECTION, SOLUTION INTRAVENOUS at 01:11

## 2017-01-01 RX ADMIN — NOREPINEPHRINE BITARTRATE 3 MCG/KG/MIN: 1 INJECTION INTRAVENOUS at 08:11

## 2017-01-01 RX ADMIN — MAGNESIUM SULFATE IN WATER 2 G: 40 INJECTION, SOLUTION INTRAVENOUS at 08:11

## 2017-01-01 RX ADMIN — NOREPINEPHRINE BITARTRATE 3 MCG/KG/MIN: 1 INJECTION INTRAVENOUS at 09:11

## 2017-01-01 RX ADMIN — EPINEPHRINE 1 MCG/KG/MIN: 1 INJECTION PARENTERAL at 12:11

## 2017-01-01 RX ADMIN — MORPHINE SULFATE 4 MG: 4 INJECTION INTRAVENOUS at 04:11

## 2017-01-01 RX ADMIN — CEFTRIAXONE 1 G: 1 INJECTION, POWDER, FOR SOLUTION INTRAMUSCULAR; INTRAVENOUS at 05:11

## 2017-01-01 RX ADMIN — SODIUM CHLORIDE, PRESERVATIVE FREE 10 ML: 5 INJECTION INTRAVENOUS at 07:11

## 2017-01-01 RX ADMIN — SODIUM CHLORIDE, PRESERVATIVE FREE 10 ML: 5 INJECTION INTRAVENOUS at 11:11

## 2017-01-01 RX ADMIN — POTASSIUM CHLORIDE 60 MEQ: 20 SOLUTION ORAL at 05:11

## 2017-01-01 RX ADMIN — DEXTROSE MONOHYDRATE: 5 INJECTION, SOLUTION INTRAVENOUS at 12:11

## 2017-01-01 RX ADMIN — SUCCINYLCHOLINE CHLORIDE 100 MG: 20 INJECTION, SOLUTION INTRAMUSCULAR; INTRAVENOUS at 06:11

## 2017-01-01 RX ADMIN — PROPOFOL 20 MCG/KG/MIN: 10 INJECTION, EMULSION INTRAVENOUS at 07:11

## 2017-01-01 RX ADMIN — DEXTROSE MONOHYDRATE 8 MG/HR: 5 INJECTION, SOLUTION INTRAVENOUS at 10:11

## 2017-01-01 RX ADMIN — FENTANYL CITRATE 25 MCG: 50 INJECTION INTRAMUSCULAR; INTRAVENOUS at 06:11

## 2017-01-01 RX ADMIN — OCTREOTIDE ACETATE 50 MCG/HR: 1000 INJECTION, SOLUTION INTRAVENOUS; SUBCUTANEOUS at 05:11

## 2017-01-01 RX ADMIN — NOREPINEPHRINE BITARTRATE 3 MCG/KG/MIN: 1 INJECTION INTRAVENOUS at 02:11

## 2017-01-01 RX ADMIN — DEXTROSE MONOHYDRATE 8 MG/HR: 5 INJECTION, SOLUTION INTRAVENOUS at 03:11

## 2017-01-01 RX ADMIN — SODIUM BICARBONATE 50 MEQ: 84 INJECTION INTRAVENOUS at 09:11

## 2017-01-01 RX ADMIN — OCTREOTIDE ACETATE 50 MCG/HR: 1000 INJECTION, SOLUTION INTRAVENOUS; SUBCUTANEOUS at 12:11

## 2017-01-01 RX ADMIN — EPINEPHRINE 0.2 MCG/KG/MIN: 1 INJECTION PARENTERAL at 07:11

## 2017-01-01 RX ADMIN — OCTREOTIDE ACETATE 50 MCG/HR: 1000 INJECTION, SOLUTION INTRAVENOUS; SUBCUTANEOUS at 02:11

## 2017-11-14 PROBLEM — R57.9 SHOCK: Status: ACTIVE | Noted: 2017-01-01

## 2017-11-14 PROBLEM — K92.2 GASTROINTESTINAL HEMORRHAGE: Status: ACTIVE | Noted: 2017-01-01

## 2017-11-14 PROBLEM — J96.01 ACUTE HYPOXEMIC RESPIRATORY FAILURE: Status: ACTIVE | Noted: 2017-01-01

## 2017-11-14 PROBLEM — D62 ACUTE BLOOD LOSS ANEMIA: Status: ACTIVE | Noted: 2017-01-01

## 2017-11-14 PROBLEM — F10.10 ETOH ABUSE: Status: ACTIVE | Noted: 2017-01-01

## 2017-11-14 PROBLEM — F10.939 ALCOHOL WITHDRAWAL SYNDROME WITH COMPLICATION: Status: ACTIVE | Noted: 2017-01-01

## 2017-11-14 PROBLEM — K74.60 CIRRHOSIS: Status: ACTIVE | Noted: 2017-01-01

## 2017-11-14 NOTE — PROGRESS NOTES
Tiffanie Mendez in [facility] updated that ngt output is up to 850cc, blood products infusing- asking for message to be relayed to Dr Kimball. Tiffanie Mendez states she will call this nurse back. JONATHAN Blancas RN updated on pt's clinical status- ngt output, hr, bp and blood products transfusing.

## 2017-11-14 NOTE — H&P
PCP: Primary Doctor No    History & Physical    Chief Complaint: Gastrointestinal bleeding    History of Present Illness:  Patient is a 47 y.o. female admitted to Hospitalist Service from [facility] Emergency Room in ICU with complaint of GI bleeding. Patient reportedly has past medical history significant for Alcoholism, Cirrhosis. Part of the history obtained from patient's . He confirmed, frequent alcohol abuse up to a pint of vodka every day. Patient gets DTs symptoms when she does not drink. Patient reported, large bloody vomitus last night. Patient went to [facility] ER and transferred to our facility. At the outside facility she received IV fluids and a Sandostatin drip. Patient got intubated in the ER.    Past Medical History:   Diagnosis Date    Cirrhosis     ETOH abuse     Ulcer of abdomen wall      No past surgical history on file.  No family history on file.  Social History   Substance Use Topics    Smoking status: Not on file    Smokeless tobacco: Not on file    Alcohol use Not on file      Review of patient's allergies indicates:  No Known Allergies  No prescriptions prior to admission.     Review of Systems: Unable to obtain due to patient's present medical condition  OBJECTIVE:     Vital Signs (Most Recent)  Temp: (!) 93.9 °F (34.4 °C) (11/14/17 1030)  Pulse: 105 (11/14/17 1030)  Resp: (!) 31 (11/14/17 1030)  BP: 110/67 (11/14/17 1030)  SpO2: 99 % (11/14/17 1030)    Physical Exam:  General appearance: well developed, appears stated age, on ventilator, active bleeding from mouth and rectum.  Head: normocephalic, atraumatic  Eyes:  conjunctivae/corneas clear. PERRL.  Nose: Nares normal. Septum midline.  Throat: lips, mucosa, and tongue normal; teeth and gums normal, no throat erythema.  Neck: supple, symmetrical, trachea midline, no JVD and thyroid not enlarged, symmetric, no tenderness/mass/nodules  Lungs:  clear to auscultation bilaterally and normal respiratory  effort  Chest wall: no tenderness  Heart: regular rate and rhythm, S1, S2 normal, no murmur, click, rub or gallop  Abdomen: soft, non-tender non-distented; bowel sounds normal; no masses,  no organomegaly  Extremities: no cyanosis, clubbing or edema.   Pulses: 2+ and symmetric  Skin: Skin color, texture, turgor normal. No rashes or lesions.  Lymph nodes: Cervical, supraclavicular, and axillary nodes normal.  Neurologic: Unresponsive     Laboratory:   CBC:   Recent Labs  Lab 11/14/17  0959   WBC 13.70*   RBC 2.15*   HGB 7.2*   HCT 21.2*   PLT 60*   MCV 99*   MCH 33.6*   MCHC 34.1     CMP:   Recent Labs  Lab 11/14/17  0707   *   CALCIUM 8.7   ALBUMIN 2.4*   PROT 4.2*      K 4.8   CO2 11*      BUN 11   CREATININE 0.7   ALKPHOS 160*   ALT 31   *   BILITOT 4.3*     ABGs:   Recent Labs  Lab 11/14/17  1231   PH 7.207*   PCO2 26.0*   PO2 217*   HCO3 10.3*   POCSATURATED 100   BE -18   Diagnostic Results:  Chest X-Ray:   Endotracheal tube appearing in satisfactory position.  No acute cardiopulmonary disease.  Nodular density right lung base suggesting nipple shadow which could be confirmed a followup study with nipple markers.    CXR: Left upper extremity PICC tip projects over the lower SVC. Mild left basilar atelectasis.    Assessment/Plan:     Active Hospital Problems    Diagnosis  POA    *Acute blood loss anemia - likely bleeding esophageal varices [D62]  Yes    Gastrointestinal hemorrhage [K92.2]  Yes    Cirrhosis [K74.60]  Yes    ETOH abuse [F10.10]  Yes    Acute hypoxemic respiratory failure [J96.01]  Yes      Patient is very critical; ongoing upper GI bleeding with hematochezia. Patient is very unstable and coding again and again. CPR is in progress, Mechanical ventilation going on. See Codes details.  is at bedside. Gave so far 4 PRBC. 6 FFP. Discussed with Dr. Kimball and Dr. Baxter few times. Patient is too unstable for them to perform scope. Patient is on IV Levophed,  Vidal-synephrine and Epinephrine IV gtt in addition to Protonix and Octreotide IV gtt. Bed side echo pending.  is very realistic and understand poor prognosis. Dr. castro will attempt scope after talking to the . Continue PRBC multiple transfusion. Will continue to update patient's  with progress.     VTE Risk Mitigation         Ordered     Medium Risk of VTE  Once      11/14/17 0901     Place sequential compression device  Until discontinued      11/14/17 0901     Place KENNETH hose  Until discontinued      11/14/17 0901        Case Costello MD  Department of Hospital Medicine   [facility]      Addendum: Critical care time spent on the evaluation and treatment of severe organ dysfunction, review of pertinent labs and imaging studies, discussions with consulting providers and discussions with patient/family 5 hrs. Patient required multiple resuscitative efforts, including transfusion of multiple PRBC, FFP, multiple IV pressors agents and multiple family meetings with patient's  and children. Patient's condition continues to be critical. Please see nursing notes for details of days progress. Prognosis guarded.

## 2017-11-14 NOTE — PROGRESS NOTES
Patient arrived from the ED via gurney, intubated, sedated with propofol. Patient OGT connected to LIWS. Abdomen is soft and distended. 2 units of blood transfusion almost completed.

## 2017-11-14 NOTE — EICU
E-alerted into pt room by bedside RN, CPR in progress. Dr. Costello, respiratory therapist, and staff at bedside.  See code documentation for additional information.

## 2017-11-14 NOTE — ED NOTES
Assumed care - pt intubated on vent (vent settings: Iu=418, RR=16, IhQ0=019%, PEEP=5), with 2 each units of PRBCs infusing via pump. Also protonix & octreotide infusing as well via pump. Remains sedated for now but soft restraints x 4 per ER MD verbal order

## 2017-11-14 NOTE — CONSULTS
"[facility]     CC: Hematemesis    HPI 47 y.o. female with history of PUD and ETOH abuse with cirrhosis, who was transferred from outside ED with red, painless, moderate to large volume hematemesis associated with AMS. During my examination patient was in shock on multiple pressors and intubated, thus history obtained from . He states she has known cirrhosis due to ETOH and last drank 2 days prior to presentation, when he found her at 2am on the bathroom floor in a puddle of bloody vomitus as well as bloody stool. He states she had a bleeding ulcer years ago, however her symptoms were not this severe. He does note she often drinks Dr. Mcelroy. To his knowledge she did not ingest other substances, however he went to bed 5.5 hours prior to finding her.    Past Medical History:   Diagnosis Date    Cirrhosis     ETOH abuse     Ulcer of abdomen wall        Surgical History:  -Unable to obtain full surgial history as intubated, in shock and altered    Social History   Substance Use Topics    Smoking status: Not on file    Smokeless tobacco: Not on file    Alcohol use Not on file   -Unable to obtain full social history    Family History:  -Unable to obtain family history as intubated, in shock and altered    Review of Systems  -Unable to obtain ROS as intubated, in shock and altered    Physical Examination  BP (!) 131/92   Pulse 105   Temp (!) 87.8 °F (31 °C)   Resp (!) 37   Ht 5' 6" (1.676 m)   Wt 61.2 kg (135 lb)   LMP  (Within Years)   SpO2 (!) 85%   BMI 21.79 kg/m²   General appearance: intubated, altered, jaundiced  HENT: Normocephalic, atraumatic, neck symmetrical, no nasal discharge  Eyes: conjunctivae/corneas clear, exophthalmos, icteric sclera  Lungs: clear to auscultation bilaterally, no dullness to percussion bilaterally, symmetric expansion, breathing unlabored  Heart:tachycardic  without rub; no displacement of the PMI   Abdomen: soft, mild distention, BS normoactive, no " obvious ascites, no caput medusa  Extremities: extremities symmetric; no clubbing, cyanosis, or edema  Integument: Skin color, texture, turgor normal; no rashes; hair distrubution normal, + jaundice  Neurologic:intubated, in shock, altered unable to follow commands      Labs in ED:  -WBC- 9.1, Hgb- 7.9, MCV_ 107, Plt- 91  -Na- 141, CO2- 11, Cr- 0.7  -Albumin- 2.4, Bili-4.3, Alk PHos- 160, AST- 138, ALT- 31  -pH- 6.9    Imaging:  CXR was independently visualized and reviewed by me and showed clear lungs, ET tube    Assessment:   47 y.o. female with decompensated cirrhosis who presents with severe anion gap metabolic acidosis and massive upper GI bleed, who currently is in shock with active brisk upper GI bleed.     Plan:  -Extensively discussed the risks and benefits of EGD with patient's , and that it is likely we may worsen her bleeding , or if we are able to control it, her poor outcome will not be affected. He fully understands and wishes to proceed with EGD  -PPI infusion  -Octreotide infusion  -Abdominal ultrasound with dopplers  -HCV Ab  -Serial CBC, ABGs  -CMP, PT/INR in AM  -Rocephin 1 gm daily    Miriam Kimball MD  [facility]  [address], [address]  [place] [address]  Office: [phone]  Fax: [phone]

## 2017-11-14 NOTE — OR NURSING
EGD completed in ICU room 506; Dr. Kimball gave verbal report to ICU nurse. Endo nurse gave report to ICU nurse. Pt remained in ICU for EGD recovery by ICU nurse.

## 2017-11-14 NOTE — ED PROVIDER NOTES
Encounter Date: 11/14/2017       History     Chief Complaint   Patient presents with    GI Bleeding     Transfer from [facility] for GI Bleeding     47-year-old female transferred from [facility] for hematemesis.  Limited history from the patient.  She does report frequent alcohol abuse up to a pint of vodka every day.  She states she has tremors if she does not drink.  She reports 1 prior EGD and a gastric ulcer.  She also reports a history of cirrhosis.  She reports hematemesis tonight but none at [place].  At the outside facility she received IV fluids and a Sandostatin drip.  No further history obtainable from the patient.      The history is provided by the patient and medical records. The history is limited by the condition of the patient.     Review of patient's allergies indicates:  No Known Allergies  Past Medical History:   Diagnosis Date    Cirrhosis     ETOH abuse     Ulcer of abdomen wall      No past surgical history on file.  No family history on file.  Social History   Substance Use Topics    Smoking status: Not on file    Smokeless tobacco: Not on file    Alcohol use Not on file     Review of Systems   Unable to perform ROS: Other       Physical Exam     Initial Vitals [11/14/17 0517]   BP Pulse Resp Temp SpO2   98/64 96 18 98.5 °F (36.9 °C) 97 %      MAP       75.33         Physical Exam    Constitutional: She appears well-developed and well-nourished.  Non-toxic appearance. She does not have a sickly appearance. She appears ill. No distress.   Appears chronically ill   HENT:   Head: Normocephalic and atraumatic.   Eyes: Pupils are equal, round, and reactive to light.   Jaundiced   Neck: Normal range of motion.   Cardiovascular: Normal rate, regular rhythm and normal heart sounds.   Pulmonary/Chest: Breath sounds normal. No respiratory distress. She has no wheezes. She has no rales.   Abdominal: Soft. She exhibits no distension. There is no tenderness.   Hepatomegaly    Musculoskeletal: Normal range of motion.   Neurological: She is alert.   Skin: Skin is warm.   Psychiatric: She has a normal mood and affect. Thought content normal.         ED Course   Intubation  Date/Time: 11/14/2017 6:55 AM  Performed by: COTY MONTELONGO  Authorized by: COTY MONTELONGO   Consent Done: Emergent Situation  Indications: airway protection  Intubation method: direct  Patient status: paralyzed (RSI)  Preoxygenation: BVM  Sedatives: etomidate  Paralytic: succinylcholine  Laryngoscope size: Mac 3  Tube size: 8.0 mm  Tube type: cuffed  Number of attempts: 1  Cricoid pressure: yes  Cords visualized: yes  Post-procedure assessment: chest rise,  ETCO2 monitor and CO2 detector  Breath sounds: clear  Cuff inflated: yes  Chest x-ray interpreted by me.  Chest x-ray findings: endotracheal tube in appropriate position  Complications: No  Specimens: No  Implants: No    IO Line - Interosseous Needle/Catheter  Date/Time: 11/14/2017 6:56 AM  Performed by: COTY MONTELONGO  Authorized by: COTY MONTELONGO   Consent Done: Emergent Situation  Indications: clinical deterioration, hemodynamic instability, fluid administration, medication administration and rapid vascular access  Local anesthesia used: no    Anesthesia:  Local anesthesia used: no  Patient sedated: no  Site preparation: chlorhexidine  Insertion site: right proximal tibia  Insertion device: drill device  Insertion: needle was inserted through the bony cortex  Number of attempts: 1  Confirmation method: stability of the needle, easy infusion of fluids and aspiration of blood/marrow  Complications: No  Specimens: No  Implants: No  Critical Care  Date/Time: 11/22/2017 12:59 PM  Performed by: COTY MONTELONGO  Authorized by: TOYA BRAND   Direct patient critical care time: 65 minutes  Additional history critical care time: 8 minutes  Ordering / reviewing critical care time: 20 minutes  Documentation critical care time: 20 minutes  Consulting other  physicians critical care time: 7 minutes  Total critical care time (exclusive of procedural time) : 120 minutes  Critical care was necessary to treat or prevent imminent or life-threatening deterioration of the following conditions: circulatory failure, hepatic failure and shock.  Critical care was time spent personally by me on the following activities: vascular access procedures, pulse oximetry, re-evaluation of patient's condition, ordering and review of radiographic studies, ordering and review of laboratory studies, evaluation of patient's response to treatment, examination of patient, ordering and performing treatments and interventions, obtaining history from patient or surrogate, discussions with consultants, discussions with primary provider and development of treatment plan with patient or surrogate.  Comments: Over 2 hours of critical care time for life-threatening GI bleed secondary to chronic alcohol abuse and the patient was intubated in the emergency room and required intraosseous access and blood transfusion.        Labs Reviewed   CBC W/ AUTO DIFFERENTIAL   COMPREHENSIVE METABOLIC PANEL   TYPE & SCREEN   PREPARE RBC SOFT             Medical Decision Making:   History:   I obtained history from: EMS provider.  Old Medical Records: I decided to obtain old medical records.  Clinical Tests:   Lab Tests: Ordered and Reviewed  Radiological Study: Ordered and Reviewed                   ED Course as of Nov 14 0737   Tue Nov 14, 2017   0605 Vomited some bright red blood  [EF]   0612 Gi contact dr crespo will call dr florian to see asap this morning. O negative packed red blood cells ordered from the lab.  I did call them.  2 mg Ativan IV will be given.  Patient is beginning to have tremors consistent with alcohol withdrawal.  [EF]   0635 Intubated for AMS, agitated, declining LOC  [EF]   0707 Dr riddle to admit  [EF]   0734 Dr castro by phone  [EF]      ED Course User Index  [EF] Raudel Connolly MD      Clinical Impression:   The primary encounter diagnosis was Gastrointestinal hemorrhage, unspecified gastrointestinal hemorrhage type. Diagnoses of Endotracheally intubated and Alcohol withdrawal syndrome with complication were also pertinent to this visit.          47-year-old female with a history of alcohol abuse and alcohol withdrawal and cirrhosis presents to the emergency room as a transfer from [facility] for hematemesis.  On arrival the patient was tremulous but could still provide a history and interact with physician.  She very shortly became altered and agitated and pulled out her IV. She was given 2 mg of IV Ativan for tachycardia and hallucinations and symptoms consistent with alcohol withdrawal.  Shortly thereafter she became obtunded requiring intubation.  Intubation was performed without difficulty.  I did discuss the case with Dr. Hayden of gastroenterology both before and after the intubation.  He has spoken with Dr. Francisco who will see the patient this morning.  Dr. riddle of [facility] will admit the patient to the ICU.  Currently packed red blood cells are infusing and IV protonix and IV octreotide.           Raudel Connolly MD  11/14/17 0903       Raudel Connolly MD  11/22/17 1300

## 2017-11-14 NOTE — SIGNIFICANT EVENT
Results for EUGENIO KIM (MRN [id number]) as of 11/14/2017 10:37   Ref. Range 11/14/2017 10:09   POC PH Latest Ref Range: 7.35 - 7.45  7.343 (L)   POC PCO2 Latest Ref Range: 35 - 45 mmHg 36.6   POC PO2 Latest Ref Range: 80 - 100 mmHg 587 (H)   POC BE Latest Ref Range: -2 to 2 mmol/L -6   POC HCO3 Latest Ref Range: 24 - 28 mmol/L 19.8 (L)   POC SATURATED O2 Latest Ref Range: 95 - 100 % 100   POC TCO2 Latest Ref Range: 23 - 27 mmol/L 21 (L)   FiO2 Unknown 100   Vt Unknown 400   PiP Unknown 19   PEEP Unknown 5   Sample Unknown ARTERIAL   DelSys Unknown Adult Vent   Allens Test Unknown N/A   Site Unknown RB   Mode Unknown AC/PRVC   ETCO2 Unknown 16   Min Vol Unknown 15.4   Rate Unknown 16   Sp02 Unknown 100   Reported to Dr. Costello, decreaased to 50%

## 2017-11-14 NOTE — PROGRESS NOTES
Dr Costello notified of blood coming through NGT- so far 850 out. States he is calling Dr Kimball re: EGD.

## 2017-11-14 NOTE — PROGRESS NOTES
0930: Respiratory therapist attempting to start Arterial line.   0939:Noted patient SPO2 decreasing, checked pulse ox line, is intact, checked patient pulse, listened to heart with stethoscope, no heart rate, patient in PEA, code  blue called, E-ICU called, chest compressions started.  0942: Patient remains in PEA with pulse check. Chest compressions continue. Patient suctioned orally, large amount of blood.   0944: Patient with pulse.   1005: IO infiltrated, blood stopped and restarted to RAC.  1035: Right hand and left hand IV's infiltrated.   1045: PICC line placed  1105:  arrived and Dr. Costello updated him on patient condition.  1215: Patient in PEA again, chest compressions initiated, code blue called, E-ICU called.  1217: Patient remains in PEA, chest compressions continued.   1219: Patient pulse returned.  1233: Patient in PEA again, chest compressions restarted.  1235: Patient pulse returned.   1300: Dr. Costello has updated patients . Emotional and spiritual support has been provided by danish medina.  Heart echo completed at bedside.   Patient to have EGD today.   1351: Lab called with critical results: Hgb 5.1, HCT 14.7. Dr. Costello notified, orders obtained.   1430: 100 ml blood suctioned from patient mouth.   1440: Endo at bedside for EGD.  1515: Dr. Costello has updated family. Chaplain Medina called.

## 2017-11-14 NOTE — PROGRESS NOTES
Dr. Costello ordered for 4 more units of blood to be transfused. Order placed. Blood bank notified by phone.

## 2017-11-14 NOTE — PROCEDURES
"Adilene Farnsworth is a 47 y.o. female patient.    Temp: (!) 44.6 °F (7 °C) (11/14/17 0700)  Pulse: 104 (11/14/17 1000)  Resp: (!) 36 (11/14/17 1000)  BP: (!) 53/26 (11/14/17 1000)  SpO2: 100 % (11/14/17 1000)  Weight: 61.2 kg (135 lb) (11/14/17 0517)  Height: 5' 6" (167.6 cm) (11/14/17 0517)    PICC  Date/Time: 11/14/2017 10:56 AM  Performed by: FRANCESCO ARREOLA  Consent Done: Yes  Time out: Immediately prior to procedure a time out was called to verify the correct patient, procedure, equipment, support staff and site/side marked as required  Indications: med administration and vascular access  Anesthesia: local infiltration  Local anesthetic: lidocaine 1% without epinephrine  Anesthetic Total (mL): 3  Preparation: skin prepped with ChloraPrep  Skin prep agent dried: skin prep agent completely dried prior to procedure  Sterile barriers: all five maximum sterile barriers used - cap, mask, sterile gown, sterile gloves, and large sterile sheet  Hand hygiene: hand hygiene performed prior to central venous catheter insertion  Location details: left brachial  Catheter type: triple lumen  Catheter size: 5 Fr  Catheter Length: 36cm    Ultrasound guidance: yes  Vessel Caliber: medium and patent, compressibility normal  Needle advanced into vessel with real time Ultrasound guidance.  Guidewire confirmed in vessel.  Image recorded and saved.  Sterile sheath used.  Number of attempts: 1  Post-procedure: blood return through all ports, chlorhexidine patch and sterile dressing applied    Assessment: placement verified by x-ray, no pneumothorax on x-ray, tip termination and successful placement  Tip Termination Explanation: lower SVC   Complications: none        Francesco Arreola  11/14/2017  "

## 2017-11-14 NOTE — PLAN OF CARE
Problem: Patient Care Overview  Goal: Plan of Care Review  Outcome: Ongoing (interventions implemented as appropriate)  Arrived from ER intubated w/ 8,0 [place] attempted, pt coded x3 . CPR performed, drugs given. respomding to drugs but still very unstable. Remains on vent w/ settings on flowsheet. Will continue to monitor

## 2017-11-14 NOTE — PROGRESS NOTES
Patients , Javier Farnsworth, called. He reports that he found her on the floor passed out early in the morning and that she has had this happen several times in the past over the last 15 years. Informed the  that Dr. Kimball will be performing a procedure that will require consent. Husbands phone number is [phone]. He states he will try to be here to give a signed consent versus a phone consent.

## 2017-11-15 PROBLEM — E87.20 LACTIC ACIDOSIS: Status: ACTIVE | Noted: 2017-01-01

## 2017-11-15 PROBLEM — R65.21 SEPTIC SHOCK: Status: ACTIVE | Noted: 2017-01-01

## 2017-11-15 PROBLEM — D68.9 COAGULOPATHY: Status: ACTIVE | Noted: 2017-01-01

## 2017-11-15 PROBLEM — A41.9 SEPTIC SHOCK: Status: ACTIVE | Noted: 2017-01-01

## 2017-11-15 PROBLEM — D69.6 THROMBOCYTOPENIA: Status: ACTIVE | Noted: 2017-01-01

## 2017-11-15 PROBLEM — J81.0 PULMONARY EDEMA, ACUTE: Status: ACTIVE | Noted: 2017-01-01

## 2017-11-15 PROBLEM — I46.9 PEA (PULSELESS ELECTRICAL ACTIVITY): Status: ACTIVE | Noted: 2017-01-01

## 2017-11-15 NOTE — PLAN OF CARE
Problem: Patient Care Overview  Goal: Individualization & Mutuality  Recommendation/Intervention: 1) Consider Clinimix E 2.75/5 @ 100 mls/hr with 20% lipids (250 mls over 12 hrs) to provide 1172 calories, 66 gms protein, 2400 mls fluid, GIR 1.12.  Hold if triglycerides >400.  2) RD to follow   Goals: Provide nutrition within 96 hrs  Nutrition Goal Status: new  Communication of RD Recs:  (sticky note)

## 2017-11-15 NOTE — PROGRESS NOTES
After withdrawal of care, patient  spontaneously. Patient unresponsive to physical and verbal stimulation. No heart tones, no spontaneous respirations, pupils fixed and dilated  Patient pronounced dead at 4:33 PM.  at bedside.

## 2017-11-15 NOTE — PROGRESS NOTES
Consult called to Dr. Begum, informed him of events leading up to pt's arrival yesterday and yesterdays events. MD stated could try increasing PEEP to 12 if BP was catalina. Also stated that a recruitment maneuver could be performed if her BP could tolerate it. Pt's current BP 64/48, will have to hold off on increasing PEEP until BP catalina. Pt maxed onlevo and norberto, Epinephrine titrated up per MAR.    Montez Cole RN

## 2017-11-15 NOTE — SIGNIFICANT EVENT
Results for EUGENIO KIM (MRN [id number]) as of 11/15/2017 14:24   Ref. Range 11/15/2017 12:52   POC PH Latest Ref Range: 7.35 - 7.45  7.168 (LL)   POC PCO2 Latest Ref Range: 35 - 45 mmHg 33.4 (L)   POC PO2 Latest Ref Range: 80 - 100 mmHg 73 (L)   POC BE Latest Ref Range: -2 to 2 mmol/L -16   POC HCO3 Latest Ref Range: 24 - 28 mmol/L 12.1 (L)   POC SATURATED O2 Latest Ref Range: 95 - 100 % 90 (L)   POC TCO2 Latest Ref Range: 23 - 27 mmol/L 13 (L)   FiO2 Unknown 100   Vt Unknown 400   PiP Unknown 28   PEEP Unknown 8   Sample Unknown ARTERIAL   DelSys Unknown Adult Vent   Allens Test Unknown N/A   Site Unknown Jyoti/UAC   Mode Unknown AC/PRVC   ETCO2 Unknown 19   Min Vol Unknown 16.1   Rate Unknown 26   Reported to Montez Cole RN

## 2017-11-15 NOTE — PROGRESS NOTES
Progress Note  Hospital Medicine  Patient Name:Adilene Farnsworth  MRN:  [id number]  Patient Class: IP- Inpatient  Admit Date: 11/14/2017  Length of Stay: 1 days  Expected Discharge Date:   Attending Physician: Case Costello MD  Primary Care Provider:  Primary Doctor No    SUBJECTIVE:     Principal Problem: Acute blood loss anemia  Initial history of present illness: Patient is a 47 y.o. female admitted to Hospitalist Service from [facility] Emergency Room in ICU with complaint of GI bleeding. Patient reportedly has past medical history significant for Alcoholism, Cirrhosis. Part of the history obtained from patient's . He confirmed, frequent alcohol abuse up to a pint of vodka every day. Patient gets DTs symptoms when she does not drink. Patient reported, large bloody vomitus last night. Patient went to [facility] ER and transferred to our facility. At the outside facility she received IV fluids and a Sandostatin drip. Patient got intubated in the ER.    PMH/PSH/SH/FH/Meds: reviewed.    Symptoms/Review of Systems: On Ventilator, blinking eyes, follows commands at times. On multiple pressors which are maxed out. On Sodium bicarbonate infusion and IV Protonix and Octreotide infusions. No obvious bleeding overnight, H/H somewhat stable. Received Platelet transfusion last night.     Diet:  NPO  Activity level: Bed rest      OBJECTIVE:   Vital Signs (Most Recent):      Temp: 97.8 °F (36.6 °C) (11/15/17 0714)  Pulse: (!) 112 (11/15/17 0700)  Resp: (!) 36 (11/15/17 0714)  BP: (!) 104/50 (11/15/17 0714)  SpO2: (!) 90 % (11/15/17 0714)       Vital Signs Range (Last 24H):  Temp:  [36 °F (2.2 °C)-98.4 °F (36.9 °C)]   Pulse:  []   Resp:  []   BP: ()/()   SpO2:  [73 %-100 %]   Arterial Line BP: ()/(19-83)     Weight: 74.3 kg (163 lb 12.8 oz)  Body mass index is 26.44 kg/m².    Intake/Output Summary (Last 24 hours) at 11/15/17 0738  Last data filed at 11/15/17 0700   Gross per 24  hour   Intake         86444.89 ml   Output               70 ml   Net         12446.89 ml     Physical Examination:  General appearance: well developed, appears stated age, on ventilator  Head: normocephalic, atraumatic  Eyes:  conjunctivae/corneas clear. PERRL.  Nose: Nares normal. Septum midline.  Throat: lips, mucosa, and tongue normal; teeth and gums normal, no throat erythema Neck: supple, symmetrical, trachea midline, no JVD and thyroid not enlarged, symmetric, no tenderness/mass/nodules  Lungs:  clear to auscultation bilaterally and normal respiratory effort  Chest wall: no tenderness  Heart: regular rate and rhythm, S1, S2 normal, no murmur, click, rub or gallop  Abdomen: soft, non-tender, mildly distended; bowel sounds normal; no masses,  no organomegaly  Extremities: no cyanosis or edema, or clubbing  Pulses: 2+ and symmetric  Skin: Skin color, texture, turgor normal. No rashes or lesions.  Lymph nodes: Cervical, supraclavicular, and axillary nodes normal.  Neurologic: Moving all extremities, at times follows commands,    CBC:    Recent Labs  Lab 11/14/17  2124 11/14/17  2356 11/15/17  0425   WBC 12.60 10.70 9.70   RBC 3.08* 2.28* 2.83*   HGB 9.1*  9.1* 6.7* 8.4*  8.4*   HCT 27.2* 19.9* 25.1*   * 100* 67*   MCV 88 88 89   MCH 29.6 29.4 29.6   MCHC 33.5 33.6 33.4   BMP    Recent Labs  Lab 11/14/17  2124 11/14/17  2356 11/15/17  0425   * 141* 116*   * 144 143   K 3.4* 3.5 3.6    102 99   CO2 6* 7* 9*   BUN 10 9 9   CREATININE 1.1 1.2 1.4   CALCIUM 6.6* 6.3* 6.3*   MG  --   --  1.4*      Diagnostic Results:  Microbiology Results (last 7 days)     ** No results found for the last 168 hours. **         Chest X-Ray:   Endotracheal tube appearing in satisfactory position.  No acute cardiopulmonary disease.  Nodular density right lung base suggesting nipple shadow which could be confirmed a followup study with nipple markers.     CXR: Left upper extremity PICC tip projects over the lower  SVC. Mild left basilar atelectasis.    EGD:  - Bleeding grade II esophageal varices. Completely                        eradicated. Banded.                       - Vascular lesion at GE junction, spurting blood.                        Clips were placed.                       - Red blood in the gastric fundus.                       - Portal hypertensive gastropathy.                       - Normal examined duodenum.                       - No specimens collected.    Assessment/Plan:      *Acute blood loss anemia - likely bleeding esophageal varices [D62]   Yes    Gastrointestinal hemorrhage [K92.2]   Yes    Cirrhosis [K74.60]  Case discussed with Dr. Kimball. No need to transfer at this time to [facility] as patient is not considered stable.  Keep patient NPO.  Follow H/H closely. Transfuse as needed.  Continue IV Protonix infusion 8 mg/hr + Octreotide infusion.  Continue IVF hydration.   Use IV anti-emetics as needed.      Yes    ETOH abuse [F10.10]  Patient will be counseled at some point for alcohol abstinence.  Watch for DTs.     Yes    Acute hypoxemic respiratory failure [J96.01]  Continue mechanical ventilation.  Consult Dr. Begum.    Severe Metabolic acidosis  Continue IVF with sodium bicarbonate supplementation.  Consult Dr. Roche. Follow Bicarbonate level  Check lactic acid    Hypomagnesemia  Replete Mag. Follow level.     Hypocalcemia  Replete Ca Gluconate    Hypokalemia  Replete KCl,. Follow BMP.    Shock  Follow ECHO results.  Continue IV pressors to maintain MAP ~ 60-65 mmHg.  Watch for ischemic liver and ATN development.    Thrombocytopenia  Follow platelet count and transfuse as needed.     Yes     Discussed at length with patient's  again and he is aware of poor prognosis, I answered all the questions.   VTE Risk Mitigation         Ordered     Medium Risk of VTE  Once      11/14/17 0901     Place sequential compression device  Until discontinued      11/14/17 0901     Place KENNETH hose  Until  discontinued      11/14/17 0901        Case Costello MD  Department of Hospital Medicine   [facility]

## 2017-11-15 NOTE — PROGRESS NOTES
"[facility] Note    CC: Hematemesis    HPI 47 y.o. female with ETOH cirrhosis, admitted with ETOH hepatitis and massive hematemesis with severe AGMA and shock, s/p EGD yesterday which revealed actively bleeding vascular lesion at GE junction as well as esophageal varices, treated with hemoclip placement and banding with hemostasis achieved. No further significant GI bleeding, however remains in shock on 3 pressors.     Past Medical History:   Diagnosis Date    Cirrhosis     ETOH abuse     Ulcer of abdomen wall      Review of Systems  Unable to obtain as intubated and minimally responsive    Physical Examination  /66   Pulse 103   Temp 97.7 °F (36.5 °C)   Resp (!) 37   Ht 5' 6" (1.676 m)   Wt 74.3 kg (163 lb 12.8 oz)   LMP  (Within Years)   SpO2 (!) 90% Comment: on abg  Breastfeeding? No   BMI 26.44 kg/m²   General appearance: intubated, minimally responsive, diffusely jaundiced, ill appearing  HENT: Normocephalic, atraumatic, neck symmetrical, no nasal discharge, sclera icteric , exophthalmos, ET tube in place  Lungs: intubated and mechanically ventilated, decreased breath sounds at bases, symmetric chest wall expansion bilaterally  Heart: tachycardic without rub; no displacement of the PMI   Abdomen: soft, moderate distention, BS hypoactive, no obvious tenderness though patient altered  Extremities: extremities symmetric; no clubbing, cyanosis, or edema; diffuse jaundice    Labs:  Lab Results   Component Value Date    WBC 11.40 11/15/2017    HGB 8.1 (L) 11/15/2017    HGB 8.1 (L) 11/15/2017    HCT 23.0 (L) 11/15/2017    MCV 87 11/15/2017    PLT 55 (L) 11/15/2017     -INR- 2.8, Na- 140, BUN- 10, Cr- 1.6, Bili- 8.2, AST- 7915, ALT- 825, Albumin - 2.4    Imaging:  CXR from today was independently visualized and reviewed by me and showed significant bilateral airspace disease    Assessment:   47 y.o. female with decompensated ETOH cirrhosis, admitted with ETOH hepatitis and massive GI " bleed due to vascular lesions, now with multi-organ shock refractory to 3 pressors. No further significant bleeding, now shock liver and worsening renal function.     Plan:  -Agree with DNR status   -Vitamin K 10 mg subq daily    Miriam Kimball MD  [facility]  [address], [address]  [place] [address]  Office: [phone]  Fax: [phone]

## 2017-11-15 NOTE — CONSULTS
"  11/15/2017      Admit Date: 11/14/2017  Adilene Farnsworth  New Patient Consult    Chief Complaint   Patient presents with    GI Bleeding     Transfer from [facility] for GI Bleeding       History of Present Illness:  Pt presented yesterday after being found passed out on floor ( had been found such in past) with hematemesis with h/o pint vodka daily  And h/o cirrhosis. Pt dose ivf andsandostatin drip at [place] and transferred to [facility].   Pt was intubated and intraosseous catheter placed in er upon arrival.  Pt had pea several times yesterday, and  required several resuscitations with cpr,  and pressors and blood yesterday.  Pt worsened and with lactate level over 12 this am and marginal bp and acute renal failure, I was asked to eval - 100% o2 needed.  Attempts made to increase peep but bp could not tolerate.  Pt was evaluated yest by GI.  Renal has seen her as renal failure apparent.       interviewed and pt had no apparent resp or cor h/o apparent.  She may have had some aldana.      PFSH:  Past Medical History:   Diagnosis Date    Cirrhosis     ETOH abuse     Ulcer of abdomen wall      History reviewed. No pertinent surgical history.  Social History   Substance Use Topics    Smoking status: Unknown If Ever Smoked    Smokeless tobacco: Not on file    Alcohol use Not on file      Comment: DAVID     History reviewed. No pertinent family history.  Review of patient's allergies indicates:  No Known Allergies     Review of Systems:  due to neurologic status/impairments a Review of Systems could not be obtained       Exam:Comprehensive exam done. /66   Pulse 103   Temp 97.7 °F (36.5 °C)   Resp (!) 37   Ht 5' 6" (1.676 m)   Wt 74.3 kg (163 lb 12.8 oz)   LMP  (Within Years)   SpO2 (!) 90% Comment: on abg  Breastfeeding? No   BMI 26.44 kg/m²   Exam included Vitals as listed, and patient's appearance and affect and alertness and mood, oral exam for yeast and hygiene and pharynx lesions and " Mallapatti (M) score, neck with inspection for jvd and masses and thyroid abnormalities and lymph nodes (supraclavicular and infraclavicular nodes also examined and noted if abn), chest exam included symmetry and effort and fremitus and percussion and auscultation, cardiac exam included rhythm and gallops and murmur and rubs and jvd and edema, abdominal exam for mass and hepatosplenomegaly and tenderness and hernias and bowel sounds, Musculoskeletal exam with muscle tone and posture and mobility/gait and  strenght, and skin for rashes and cyanosis and pallor and turgor, extremity for clubbing.  Findings were normal except as listed below:   intubated and awake. She seems to respond to voice, no apparent pain, subconjectival edema, no neck nodes, symmetric chest, air hunger apparent with rr 40 (set rate 26).  Min rales, no dullness or abn fremitus. No clubbing, not moving extremities  (sbp in 40-50 for exam), cool skin, poor refil capillaries.    Radiographs reviewed: view by direct vision looks cardiogenic pulm edema  Results for orders placed during the hospital encounter of 11/14/17   X-Ray Chest 1 View    Narrative AP chest compared to 11/14/17    Findings: The endotracheal tube remains appropriately positioned with its tip at the level of the clavicular heads. A left upper extremity PICC line has its tip in the superior vena cava. The heart size is upper normal. There is new moderate perihilar and bibasilar airspace disease and small layering pleural effusions. No pneumothorax. Surgical clips are seen in the epigastrium.    Impression Stable, appropriate position of support devices.  New moderate bilateral airspace disease which may reflect pneumonia, edema, or ARDS.  New small bilateral pleural effusions.      Electronically signed by: Binh Sabillon MD  Date:     11/15/17  Time:    11:46    ]    Labs       Recent Labs  Lab 11/15/17  1135   WBC 11.40   HGB 8.1*  8.1*   HCT 23.0*   PLT 55*   BAND 2.0        Recent Labs  Lab 11/15/17  0113 11/15/17  0425 11/15/17  0747 11/15/17  0856 11/15/17  1135   NA  --  143 141  --  140   K  --  3.6 3.2*  --  3.3*   CL  --  99 99  --  97   CO2  --  9* 10*  --  11*   BUN  --  9 10  --  10   CREATININE  --  1.4 1.5*  --  1.6*   GLU  --  116* 82  --  84   CALCIUM  --  6.3* 5.9*  --  6.1*   CAION  --   --  0.65*  --   --    MG  --  1.4*  --   --   --    AST  --  1,754* 4,104*  --  7,915*   ALT  --  289* 555*  --  825*   ALKPHOS  --  61 79  --  118   BILITOT  --  6.4* 7.4*  --  8.2*   PROT  --  4.0* 3.9*  --  3.7*   ALBUMIN  --  2.6* 2.5*  --  2.4*   INR 2.8*  --   --   --   --    LACTATE  --   --   --  >12.0*  --    TROPONINI  --  0.160*  --   --   --    CPK  --  3380*  3380*  --   --  5569*   CPKMB  --  162.4*  162.4*  --   --  255.9*   MB  --  4.8  4.8  --   --  4.6       Recent Labs  Lab 11/15/17  1252   PH 7.168*   PCO2 33.4*   PO2 73*   HCO3 12.1*     Microbiology Results (last 7 days)     ** No results found for the last 168 hours. **      Results for EUGENIO KIM (MRN [id number]) as of 11/15/2017 14:52   Ref. Range 11/15/2017 09:54 11/15/2017 12:52   POC PH Latest Ref Range: 7.35 - 7.45  7.231 (LL) 7.168 (LL)   POC PCO2 Latest Ref Range: 35 - 45 mmHg 29.1 (L) 33.4 (L)   POC PO2 Latest Ref Range: 80 - 100 mmHg 64 (L) 73 (L)   POC BE Latest Ref Range: -2 to 2 mmol/L -15 -16   POC HCO3 Latest Ref Range: 24 - 28 mmol/L 12.2 (L) 12.1 (L)   POC SATURATED O2 Latest Ref Range: 95 - 100 % 88 (L) 90 (L)       Impression:  Active Hospital Problems    Diagnosis  POA    *Acute blood loss anemia - likely bleeding esophageal varices [D62]  Yes    Thrombocytopenia [D69.6]  Yes    Coagulopathy [D68.9]  Yes    Septic shock [A41.9, R65.21]  Yes    Lactic acidosis [E87.2]  Yes    PEA (Pulseless electrical activity) [I46.9]  No    Pulmonary edema, acute [J81.0]  Clinically Undetermined    Gastrointestinal hemorrhage [K92.2]  Yes    Cirrhosis of liver without ascites [K74.60]  Yes     ETOH abuse [F10.10]  Yes    Acute hypoxemic respiratory failure [J96.01]  Yes    Hemorrhagic shock [R57.9]  Yes    Alcohol withdrawal syndrome with complication [F10.239]  Yes      Resolved Hospital Problems    Diagnosis Date Resolved POA   No resolved problems to display.               Plan: pt has refractory shock with massive gi bleed and resp and renal failure.  Doubt she can be supported much longer.   aware of outlook - death annmarie .  Pt is DNR now.  Vent adjustment attempted by to no avail.  Versed and fentanyl ordered to minimize suffering as needed.        The following were evaluated and adjusted as needed: mechanical ventilator settings and weaning status, vasopressors, intravenous fluids and nutritional status, sedation and neurologic status, antibiotics, hemodynamics, support tubes and access lines and invasive monitoring, acid base balance and oxygenation needs, input and output and renal status and CODE STATUS/OUTLOOK DISCUSSED WITH AVAILABLE NEXT OF  KIN       Critical Care  - THE PATIENT HAS A HIGH PROBABILITY OF IMMINENT OR LIFE THREATENING DETERIORATION.  Over 50%time of encounter was in direct care at bedside.  Time was 30 to 74 minutes required for patient care.  Time needed for all of the above totaled 40 minutes.

## 2017-11-15 NOTE — PROGRESS NOTES
Dr. Costello at BS, aware of AM labs, ABG, u/o, and VS. New orders received. He spoke to pt's  Ivan at BS, all questions answered.     Montez Cole RN

## 2017-11-15 NOTE — PROGRESS NOTES
Last Rights completed. Mr. Love is ready to withdraw care, doesn't want Adilene to suffer any longer. Dr. Costello called, states he will be up shortly.    Montez Cole RN

## 2017-11-15 NOTE — PROGRESS NOTES
I had a long conversation w/ pt's  Ivan about pt's poor prognosis. I informed him that since this AM her blood pressure has continue to drop requiring increased BP support. I did inform him that pt is almost maxed on BP and ventilator support if she were to code there was not much more that could be done. I asked him about DNR status and he said he wanted to think about it and speak to pt's brother. I also called Dr. Costello to have him come speak w/ Mr. Love again, MD stated he would be here shortly.    Montez Cole RN

## 2017-11-15 NOTE — PLAN OF CARE
11/15/17 1419   Discharge Assessment   Assessment Type Discharge Planning Assessment     Pt is in critical condition. , Javier ([phone]) has been making decisions.   CM will follow up at later time to complete assessment.....SANDER Klein CM

## 2017-11-15 NOTE — PROGRESS NOTES
SANDEE and  called by Arpita Lawrence RN. Body released to [facility]. Security called to come transfer pt to [facility].    Montez Cole RN

## 2017-11-15 NOTE — CHAPLAIN
As code care team worked once more on the patient, the  cared for her grieving, scared  in the ICU waiting area, and offered prayers for the patient and the care team at her bedside. The  went to the ICU for frequent updates that seemed to comfort the patient's . The  will continue to care for patient and family.

## 2017-11-15 NOTE — CONSULTS
[facility]  Adult Nutrition  Consult Note    SUMMARY     Recommendations    Recommendation/Intervention: 1) Consider Clinimix E 2.75/5 @ 100 mls/hr with 20% lipids (250 mls over 12 hrs) to provide 1172 calories, 66 gms protein, 2400 mls fluid, GIR 1.12.  Hold if triglycerides >400.  2) RD to follow   Goals: Provide nutrition within 96 hrs  Nutrition Goal Status: new  Communication of RD Recs:  (sticky note)      Reason for Assessment    Reason for Assessment: nurse/nurse practitioner consult      1. Gastrointestinal hemorrhage, unspecified gastrointestinal hemorrhage type    2. Endotracheally intubated    3. Alcohol withdrawal syndrome with complication    4. Shock    5. Acute blood loss anemia    6. Acute hypoxemic respiratory failure    7. Cirrhosis of liver without ascites, unspecified hepatic cirrhosis type      Past Medical History:   Diagnosis Date    Cirrhosis     ETOH abuse     Ulcer of abdomen wall         General Information Comments: Admitted with c/o GI bleed, large bloody vomitus and respiratory failure. Pt is on vent. No family present at RD visit. Obtained information from chart.  No wt hx in chart.  Pt consumes 1 pint vodka daily. Pt is on vent, but not on propofol at this time.     Nutrition Prescription Ordered    Current Diet Order: NPO     Evaluation of Received Nutrients/Fluid Intake        Energy Calories Required: not meeting needs      Protein Required: not meeting needs      Fluid Required:  (per primary team or RDA)   % Intake of Estimated Energy Needs: 0 - 25 %  % Meal Intake: NPO     Nutrition Risk Screen     Nutrition Risk Screen: other (see comments)    Nutrition/Diet History        Food Preferences: No cultural or religous food preferences identified.         Labs/Tests/Procedures/Meds    Diagnostic Test/Procedure Review: reviewed, pertinent  Pertinent Labs Reviewed: reviewed, pertinent      Lab Results   Component Value Date    ALBUMIN 2.4 (L) 11/15/2017  "    No results found for: CRP  Lab Results   Component Value Date     (H) 11/15/2017    AST 7,915 (H) 11/15/2017    ALKPHOS 118 11/15/2017    BILITOT 8.2 (H) 11/15/2017       Pertinent Medications Reviewed: reviewed, pertinent      Scheduled Meds:   sodium chloride   Intravenous Once    piperacillin-tazobactam (ZOSYN) IVPB  4.5 g Intravenous Q8H    sodium chloride 0.9%  10 mL Intravenous Q6H     Continuous Infusions:   epinephrine infusion 1.5 mcg/kg/min (11/15/17 1441)    norepinephrine bitartrate-D5W      octreotide (SANDOSTATIN) infusion 50 mcg/hr (11/15/17 1332)    custom IVPB builder      phenylephrine      sodium bicarbonate drip 150 mL/hr at 11/15/17 0912         Physical Findings    Overall Physical Appearance: on ventilator support     Oral/Mouth Cavity: WDL  Skin:  (Juan F score 11)    Anthropometrics    Temp: 97.7 °F (36.5 °C)     Height: 5' 6"  Weight Method: Bed Scale  Weight: 74.3 kg (163 lb 12.8 oz)     Ideal Body Weight (IBW), Female: 130 lb     % Ideal Body Weight, Female (lb): 126 lb  BMI (Calculated): 26.5  BMI Grade: 25 - 29.9 - overweight        Estimated/Assessed Needs    Weight Used For Calorie Calculations: 74.3 kg (163 lb 12.8 oz)   Height (cm): 167.6 cm     Energy Need Method: [place] State      RMR (Converse-St. Jeor Equation): 1384.75  RMR ([place] State Equation): 1711.09  RMR (Modified [place] State Equation): 2025.73  Weight Used For Protein Calculations: 74.3 kg (163 lb 12.8 oz)     1.0 gm Protein (gm): 74.46 and 1.5 gm Protein (gm): 111.68     Fluid Need Method: RDA Method or per primary team       CHO Requirement: na     Assessment and Plan    Nutrition Problem  Inadequate energy intake    Related to (etiology):   Inability to consume sufficient energy    Signs and Symptoms (as evidenced by):   NPO and bloody vomitus prior to admit    Interventions/Recommendations (treatment strategy):  Initiate PPN     Nutrition Diagnosis Status:   New    Monitor and Evaluation    Food and " Nutrient Intake: energy intake  Food and Nutrient Adminstration: diet order   Anthropometric Measurements: weight  Biochemical Data, Medical Tests and Procedures: inflammatory profile (alt, ast)  Nutrition-Focused Physical Findings: skin    Nutrition Risk    Level of Risk:  (2 x weekly)    Nutrition Follow-Up  yes     Discharge Plan     to be determined

## 2017-11-15 NOTE — CONSULTS
INPATIENT NEPHROLOGY CONSULT   [facility] NEPHROLOGY    Adilene Farnsworth  11/15/2017    Reason for consultation:     Acute kidney injury    Chief Complaint:   Chief Complaint   Patient presents with    GI Bleeding     Transfer from [facility] for GI Bleeding          History of Present Illness:      Pt with history of alcoholism and cirrhosis was admitted with massive gi bleed.  She had multiple ACLS interventions yesterday.  Today she is on multiple vasopressors with severe lactic acidosis and acute kidney injury due to acute tubular necrosis        Plan of Care:       Assessment:     Acute kidney injury  Acute tubular necrosis  Profound hypotension  Cirrhosis with gi bleed and coagulopathy      Plan:    1. Acute Kidney Injury-continue volume resuscitation.  Try to keep sbp greater than 90.  Not stable for dialysis.  I had a kamar d/w her  at the bedside.  I stated that her kidneys have failure.  She underwent acls multiple times in last 24 hours.  She is not stable for dialysis.  He stated that they have had discussions in the past and she wouldn't want to be kept alive by artificial means.  I agreed to DNR status.  Continue current supportive care    2. Volume/Blood Pressure-bicarb infusion    3. Electrolytes/Acid Base-try to keep map 55, getting exogenous base    4. Bone Mineral Metabolism-no active issues    5. Anemia --  Prn blood products    6. Medications- Avoid NSAIDS, COXIBS, iv radiocontrast, and aminoglycoside antibiotics when feasible.  Renal dose medication for crcl 10       Prognosis is poor.      Thank you for allowing us to participate in this patient's care. We will continue to follow.    Vital Signs:  Temp Readings from Last 3 Encounters:   11/15/17 97.7 °F (36.5 °C) (Axillary)       Pulse Readings from Last 3 Encounters:   11/15/17 104       BP Readings from Last 3 Encounters:   11/15/17 (!) 95/56       Weight:  Wt Readings from Last 3 Encounters:   11/15/17 74.3 kg (163 lb 12.8  "oz)       Past Medical & Surgical History:  Past Medical History:   Diagnosis Date    Cirrhosis     ETOH abuse     Ulcer of abdomen wall        History reviewed. No pertinent surgical history.    Past Social History:  Social History     Social History    Marital status:      Spouse name: N/A    Number of children: N/A    Years of education: N/A     Social History Main Topics    Smoking status: Unknown If Ever Smoked    Smokeless tobacco: None    Alcohol use None      Comment: DAVID    Drug use: Unknown    Sexual activity: Not Asked     Other Topics Concern    None     Social History Narrative    None       Medications:  No current facility-administered medications on file prior to encounter.      No current outpatient prescriptions on file prior to encounter.     Scheduled Meds:   sodium chloride   Intravenous Once    piperacillin-tazobactam (ZOSYN) IVPB  4.5 g Intravenous Q12H    sodium chloride 0.9%  10 mL Intravenous Q6H     Continuous Infusions:   epinephrine infusion 1.3 mcg/kg/min (11/15/17 1255)    norepinephrine bitartrate-D5W      octreotide (SANDOSTATIN) infusion 50 mcg/hr (11/15/17 1332)    custom IVPB builder      phenylephrine      sodium bicarbonate drip 150 mL/hr at 11/15/17 0912     PRN Meds:.sodium chloride, sodium chloride, sodium chloride, sodium chloride, acetaminophen, fentaNYL, ondansetron, senna-docusate 8.6-50 mg, Flushing PICC Protocol **AND** sodium chloride 0.9% **AND** sodium chloride 0.9%    Allergies:  Patient has no known allergies.    Past Family History:  Reviewed; refer to Hospitalist Admission Note    Review of Systems:  Review of Systems - All 14 systems reviewed and negative, except as noted in HPI    Physical Exam:    BP (!) 95/56   Pulse 104   Temp 97.7 °F (36.5 °C) (Axillary)   Resp (!) 40   Ht 5' 6" (1.676 m)   Wt 74.3 kg (163 lb 12.8 oz)   LMP  (Within Years)   SpO2 (!) 71%   Breastfeeding? No   BMI 26.44 kg/m²     General Appearance:    " Alert, cooperative, no distress, appears stated age   Head:    Normocephalic, without obvious abnormality, atraumatic   Eyes:    Periocular edema      Throat:   ett in place   Back:     Symmetric, no curvature, ROM normal, no CVA tenderness   Lungs:     Clear to auscultation bilaterally, respirations unlabored   Chest wall:    No tenderness or deformity   Heart:    Regular rate and rhythm, S1 and S2 normal, no murmur, rub   or gallop   Abdomen:     quiet   Extremities:   anasarca   Pulses:   2+ and symmetric all extremities   MSK:   No joint or muscle swelling, tenderness or deformity   Skin:   Skin color, texture, turgor normal, no rashes or lesions   Neurologic:   No flap     Results:  Lab Results   Component Value Date     11/15/2017    K 3.3 (L) 11/15/2017    CL 97 11/15/2017    CO2 11 (L) 11/15/2017    BUN 10 11/15/2017    CREATININE 1.6 (H) 11/15/2017    CALCIUM 6.1 (LL) 11/15/2017    ANIONGAP 32 (H) 11/15/2017    ESTGFRAFRICA 43 (A) 11/15/2017    EGFRNONAA 38 (A) 11/15/2017       Lab Results   Component Value Date    CALCIUM 6.1 (LL) 11/15/2017         Recent Labs  Lab 11/15/17  1135   WBC 11.40   RBC 2.64*   HGB 8.1*  8.1*   HCT 23.0*   PLT 55*   MCV 87   MCH 30.6   MCHC 35.1       I have personally reviewed pertinent radiological imaging and reports.      Ochoa Roche MD  Nephrology  [facility] [place]  [phone]

## 2017-11-15 NOTE — PROGRESS NOTES
Dr. Begum spoke to pt's  Ivan about poor prognosis. Ivan told me that he thinks it's time to just keep Adilene comfortable. He asked if a  was available. I informed him that could be arranged. I called  Marie who stated she would get in touch with the  to come see pt. She stated she would be up shortly as well to come offer Mr. Love spiritual/emotional support. Per family's wishes, plan is to make pt comfort care after  comes to see pt.    Montez Cole RN

## 2017-11-15 NOTE — CHAPLAIN
The  offered emotional support and spiritual care the to the patient's  at the nonverbal patient's bedside, praying with them at his request. The  will continue to care for patient and family.

## 2017-11-15 NOTE — PROGRESS NOTES
Pharmacist Renal Dose Adjustment Note    Adilene Farnsworth is a 47 y.o. female being treated with the medication Zosyn    Patient Data:    Vital Signs (Most Recent):  Temp: 97.7 °F (36.5 °C) (11/15/17 1210)  Pulse: 104 (11/15/17 1230)  Resp: (!) 40 (11/15/17 1230)  BP: (!) 95/56 (11/15/17 1225)  SpO2: (!) 71 % (11/15/17 1030)   Vital Signs (72h Range):  Temp:  [36 °F (2.2 °C)-98.5 °F (36.9 °C)]   Pulse:  []   Resp:  []   BP: ()/()   SpO2:  [71 %-100 %]   Arterial Line BP: ()/(19-83)        Recent Labs     Lab 11/15/17  0425 11/15/17  0747 11/15/17  1135   CREATININE 1.4 1.5* 1.6*     Serum creatinine: 1.6 mg/dL High 11/15/17 1135  Estimated creatinine clearance: 43.8 mL/min    Medication: Zosyn 4.5 gm q12 hrs will be changed to q8 hrs.    Pharmacist's Name: Sancho Zheng, ChrisD

## 2017-11-15 NOTE — PLAN OF CARE
Problem: Patient Care Overview  Goal: Plan of Care Review  Outcome: Ongoing (interventions implemented as appropriate)  Remains intubated on vent. Responsive to commands. Remains acidotic on Bicarb drip. Continues to have large amount rectal bleeding. Remains on Norepinephrine,Phenylephrine,Epinephrine for B/P support, No urine output at present. Plan monitor vital signs, blood products as needed, wean pressors as tolerated

## 2017-11-15 NOTE — PROGRESS NOTES
Patient's  after discussing with multiple doctor requested DNR status and now withdrawal of care. Comfort care will be followed as per their wishes. Answered all the questions.

## 2017-11-15 NOTE — PLAN OF CARE
Problem: Patient Care Overview  Goal: Plan of Care Review  Outcome: Ongoing (interventions implemented as appropriate)  Care plan reviewed.  Patient remains intubated on ventilator.  Safety maintained. Wrist restraints ordered.  Patient remains on max doses of Levophed, Vidal-synephrine and Epinephrine.   No active bleeding noted since EGD competed today.  Patient received multiple units of PRBC's and FFP's.

## 2017-11-15 NOTE — DISCHARGE SUMMARY
Discharge Summary  Hospital Medicine    Admit Date: 2017    Date and Time: 11/15/46966:37 PM    Discharge Attending Physician: Case Costello MD    Primary Care Physician: Primary Doctor No    Diagnoses:  Active Hospital Problems    Diagnosis  POA    Refractory shock with massive gastro-intestinal bleeding and respiratory and renal failure  Acute blood loss anemia - due to bleeding esophageal varices [D62]  Yes    Thrombocytopenia [D69.6]  Yes    Coagulopathy [D68.9]  Yes    Septic shock [A41.9, R65.21]  Hemorrhagic Shock  Yes    Lactic acidosis [E87.2]  Yes    PEA (Pulseless electrical activity) [I46.9]  No    Pulmonary edema, acute [J81.0]  Clinically Undetermined    Gastrointestinal hemorrhage [K92.2]  Yes    Cirrhosis of liver without ascites [K74.60]  Yes    ETOH abuse [F10.10]  Yes    Acute hypoxemic respiratory failure [J96.01]  Yes    Hemorrhagic shock [R57.9]  Yes    Alcohol withdrawal syndrome with complication [F10.239]  Yes        Discharged Condition:     Hospital Course:   Patient was a 47 y.o. female admitted to Hospitalist Service from [facility] Emergency Room in ICU with complaint of GI bleeding. Patient reportedly has past medical history significant for Alcoholism, Cirrhosis. Part of the history obtained from patient's . He confirmed, frequent alcohol abuse up to a pint of vodka every day. Patient gets DTs symptoms when she does not drink. Patient reported, large bloody vomitus last night. Patient went to [facility] ER and transferred to our facility. At the outside facility she received IV fluids and a Sandostatin drip. Patient got intubated in the ER.  Patient was admitted to Hospitalist medicine service. Patient was managed in ICU. Patient was evaluated by multiple sub-specialist. Patient developed massive GI hemorrhage requiring multiple PRBCs and FFP transfusions with aggressive IVF hydration. Patient required use of multiple IV pressor agents  simultaneous to combat the shock. Patient required multiple ACLS protocol code resuscitation efforts. Patient also given IV antibiotics. Patient under went emergent EGD showing esophageal variceal bleeding for which multiple bands and clips applied. Patient was continued with mechanical ventilation. Dr. Roche discussed with patient's  and due to low blood pressure and poor likelihood of meaningful outcome, he did not recommend HD treatment. Patient's , after consultation with doctor and family requested DNR code status, later requested withdrawal of care and comfort measures. Upon extubation, patient pronounced dead immediately at 4:33 pm on 11-15-17.     Consults: Dr. Roche, Dr. Kimball, Dr. Begum    Significant Diagnostic Studies:   Chest X-Ray:   Endotracheal tube appearing in satisfactory position.  No acute cardiopulmonary disease.  Nodular density right lung base suggesting nipple shadow which could be confirmed a followup study with nipple markers.     CXR: Left upper extremity PICC tip projects over the lower SVC. Mild left basilar atelectasis.     EGD:  - Bleeding grade II esophageal varices. Completely                        eradicated. Banded.                       - Vascular lesion at GE junction, spurting blood.                        Clips were placed.                       - Red blood in the gastric fundus.                       - Portal hypertensive gastropathy.                       - Normal examined duodenum.                       - No specimens collected.  Microbiology Results (last 7 days)     ** No results found for the last 168 hours. **        Special Treatments/Procedures: as above  Disposition:

## 2017-11-16 NOTE — CHAPLAIN
The  offered grief support and prayer to comfort the patient's  at bedside while waiting for the  to come anoint her. The  did arrive, anointed her and prayed for her  with her  and the  present, prior the withdrawal of the patient's life support. The patient's  actively grieving at bedside expressed sadness, while accepting that she would not recover.  The  will continue caring for patient and family.

## 2017-11-16 NOTE — PROCEDURES
Echocardiogram    This is limited 2D study.  The left ventricular dimension showed left   ventricular end-diastolic dimension, 3.5 and end systolic 2.6.  The estimated   ejection fraction is 50%.  There is somewhat hyperdynamic motion of the left   ventricle.  No obvious wall motion abnormality noted.  The mitral, aortic and   tricuspid valves structurally appear unremarkable.  There is minimal pericardial   effusion without any echocardiographic features of tamponade.  No intracardiac   thrombi or masses or vegetations seen on this limited study.      DAMIEN  dd: 11/16/2017 11:37:47 (CST)  td: 11/16/2017 12:12:11 (CST)  Doc ID   #[id number]  Job ID #[id number]    CC:

## 2018-02-19 PROBLEM — J81.0 PULMONARY EDEMA, ACUTE: Status: RESOLVED | Noted: 2017-01-01 | Resolved: 2018-02-19
